# Patient Record
Sex: FEMALE | Race: WHITE | NOT HISPANIC OR LATINO | Employment: OTHER | URBAN - METROPOLITAN AREA
[De-identification: names, ages, dates, MRNs, and addresses within clinical notes are randomized per-mention and may not be internally consistent; named-entity substitution may affect disease eponyms.]

---

## 2017-05-15 ENCOUNTER — TELEPHONE (OUTPATIENT)
Dept: FAMILY MEDICINE | Facility: CLINIC | Age: 72
End: 2017-05-15

## 2017-05-15 NOTE — TELEPHONE ENCOUNTER
Reason for call:  Patient reporting a symptom    Symptom or request: Sofia says she has been getting some chest pain that wakes her up. It is on the left side. She thinks it might be digestive related. She doesn't think it's heart related.  She had this last night around 9:30 and this is the second time in a couple weeks. She was going to schedule to see Dr Galvan but he is out of office the rest of this week. She thinks she might try a bland diet. I told her I would have the RN call her.     Phone Number patient can be reached at:  Home number on file [phone] (home)    Best Time:  anytime    Can we leave a detailed message on this number:  YES    Call taken on 5/15/2017 at 10:25 AM by Avril Knowles

## 2017-05-15 NOTE — TELEPHONE ENCOUNTER
Sofia Freeman is a 71 year old female who calls with chest pain.     PRESENTING PROBLEM:  Chest pain    NURSING ASSESSMENT:  Patient complains of chest pain.  Onset:  Two weeks ago  Pain is characterized as burning and sharp   Severity 7 out of 10  Located left chest   Radiates to none.  Duration intermittent.  Associated symptoms belching and indigestion.  Exacerbated by lying down and eating french fries, any fried food.  Relieved by none.  Says this has happened before and she changed her diet and chest pain diminished  Cardiac risk factors: hypertension and high cholesterol.  Associated Medical History: see problem list  Allergies:   Allergies   Allergen Reactions     Nkda [No Known Drug Allergies]        MEDICATIONS:  Taking medication(s) as prescribed? Yes  Taking over the counter medication(s) ?N/A  Any medication side effects? No significant side effects    Any barriers to taking medication(s) as prescribed?  N/A  Medication(s) improving/managing symptoms?  N/A  Medication reconciliation completed: N/A    Last exam/Treatment:  10-6-16    NURSING PLAN: Nursing advice to patient advised needs to follow up with this.  see Dr Galvan, go to the ED if worse of does not go away    RECOMMENDED DISPOSITION:  Home care advice   Will comply with recommendation: Yes  If further questions/concerns or if symptoms do not improve, worsen or new symptoms develop, call your PCP or [facility] as soon as possible.        Evelin Vanegas RN

## 2017-05-22 ENCOUNTER — OFFICE VISIT (OUTPATIENT)
Dept: FAMILY MEDICINE | Facility: CLINIC | Age: 72
End: 2017-05-22
Payer: COMMERCIAL

## 2017-05-22 VITALS
DIASTOLIC BLOOD PRESSURE: 70 MMHG | HEART RATE: 86 BPM | HEIGHT: 66 IN | TEMPERATURE: 98.4 F | WEIGHT: 175 LBS | SYSTOLIC BLOOD PRESSURE: 110 MMHG | BODY MASS INDEX: 28.12 KG/M2

## 2017-05-22 DIAGNOSIS — R07.9 CHEST PAIN, UNSPECIFIED TYPE: Primary | ICD-10-CM

## 2017-05-22 DIAGNOSIS — Z12.11 COLON CANCER SCREENING: ICD-10-CM

## 2017-05-22 PROCEDURE — 93000 ELECTROCARDIOGRAM COMPLETE: CPT | Performed by: FAMILY MEDICINE

## 2017-05-22 PROCEDURE — 99213 OFFICE O/P EST LOW 20 MIN: CPT | Performed by: FAMILY MEDICINE

## 2017-05-22 NOTE — MR AVS SNAPSHOT
After Visit Summary   5/22/2017    Sofia Freeman    MRN: [id number]           Patient Information     Date Of Birth          1945        Visit Information        Provider Department      5/22/2017 1:40 PM Danie Galvan MD [facility]        Today's Diagnoses     Chest pain, unspecified type    -  1    Colon cancer screening          Care Instructions    ASSESSMENT:   (R07.9) Chest pain, unspecified type  (primary encounter diagnosis)  Comment: This does not appear to be related to heart.  It could be from stomach or chest wall and muscles.   Plan: EKG 12-lead complete w/read - Clinics        If the pain comes back, you could try and antacid like Tums or Maalox or Mylanta to see if they help.    Sometimes we use an acid blocker like omeprazole at 20mg daily for a couple weeks if antacids do not help.   Let me know if pain is worse, occurring during the day particularly with exercise or exertion.   Let me know if additional symptoms.     (Z12.11) Colon cancer screening  Comment:   Plan: Fecal colorectal cancer screen (FIT)        Complete FIT colon cancer screening test and send in the mail.          Follow-ups after your visit        Future tests that were ordered for you today     Open Future Orders        Priority Expected Expires Ordered    Fecal colorectal cancer screen (FIT) Routine 6/12/2017 8/14/2017 5/22/2017            Who to contact     If you have questions or need follow up information about today's clinic visit or your schedule please contact [facility] directly at [phone].  Normal or non-critical lab and imaging results will be communicated to you by MyChart, letter or phone within 4 business days after the clinic has received the results. If you do not hear from us within 7 days, please contact the clinic through MyChart or phone. If you have a critical or abnormal lab result, we will notify you by phone as soon as possible.  Submit  "refill requests through [employer] or call your pharmacy and they will forward the refill request to us. Please allow 3 business days for your refill to be completed.          Additional Information About Your Visit        [employer] Information     [employer] gives you secure access to your electronic health record. If you see a primary care provider, you can also send messages to your care team and make appointments. If you have questions, please call your primary care clinic.  If you do not have a primary care provider, please call [phone] and they will assist you.        Care EveryWhere ID     This is your Care EveryWhere ID. This could be used by other organizations to access your [facility] medical records  [id number]        Your Vitals Were     Pulse Temperature Height BMI (Body Mass Index)          86 98.4  F (36.9  C) (Tympanic) 5' 5.5\" (1.664 m) 28.68 kg/m2         Blood Pressure from Last 3 Encounters:   05/22/17 110/70   10/06/16 130/84   07/07/16 118/78    Weight from Last 3 Encounters:   05/22/17 175 lb (79.4 kg)   10/06/16 173 lb (78.5 kg)   07/26/16 170 lb (77.1 kg)              We Performed the Following     EKG 12-lead complete w/read - Clinics        Primary Care Provider Office Phone # Fax #    Danie Galvan [phone]       [facility] [id number] [id number]        Thank you!     Thank you for choosing [facility]  for your care. Our goal is always to provide you with excellent care. Hearing back from our patients is one way we can continue to improve our services. Please take a few minutes to complete the written survey that you may receive in the mail after your visit with us. Thank you!             Your Updated Medication List - Protect others around you: Learn how to safely use, store and throw away your medicines at www.disposemymeds.org.          This list is accurate as of: 5/22/17  3:03 PM.  Always use your most recent med " list.                   Brand Name Dispense Instructions for use    amLODIPine 2.5 MG tablet    NORVASC    90 tablet    Take 1 tablet (2.5 mg) by mouth daily       aspirin 81 MG tablet     100 tablet    Take 1 tablet (81 mg) by mouth daily       atorvastatin 20 MG tablet    LIPITOR    45 tablet    Take 0.5 tablets (10 mg) by mouth daily       hydrochlorothiazide 25 MG tablet    HYDRODIURIL    90 tablet    Take 1 tablet (25 mg) by mouth daily       THERAPEUTIC MULTIVIT/MINERAL Tabs      1 TABLET BY MOUTH DAILY       valACYclovir 500 MG tablet    VALTREX    6 tablet    Take 1 tablet (500 mg) by mouth 2 times daily

## 2017-05-22 NOTE — PROGRESS NOTES
"  SUBJECTIVE:                                                    Sofia Freeman is a 71 year old female who presents to clinic today for the following health issues:  Chief Complaint   Patient presents with     Chest Pain      Chest Pain      Onset: Last 3 weeks     Description (location/character/radiation/duration): Patient states that 3 times over the last 3 weeks she has woken up with left sided chest pain     Intensity:  moderate, severe    Accompanying signs and symptoms:        Shortness of breath: no        Sweating: no        Nausea/vomitting: YES- nausea       Palpitations: no        Other (fevers/chills/cough/heartburn/lightheadedness): YES-Patient says that she has a lot of burping when this happens     History (similar episodes/previous evaluation): None    Precipitating or alleviating factors:       Worse with exertion: no        Worse with breathing: no        Related to eating: no        Better with burping: YES    Therapies tried and outcome: None     \"Hot\" or stabbing feeling.  Location: left anterolateral chest under left breast.   Triggering factors: has occurred only at night.   Duration: up to 3-4 hours.   It has not occurred during the day.  It has not occurred with activity.   Associated symptoms: burping, belching.   Alleviating factors: has not tried meds.     Feels she is under a lot of stress.   Tired.     BP at home has been up to 154/105.  Usually under 140/90.     Patient Active Problem List   Diagnosis     Genital herpes     Hypertension goal BP (blood pressure) < 140/90     HYPERLIPIDEMIA LDL GOAL <130      ROS:General: No change in weight, sleep or appetite.  Normal energy.  No fever or chills  Resp: No coughing, wheezing or shortness of breath  CV: Negative for palpitations  GI: No nausea, vomiting,  heartburn, abdominal pain, diarrhea, constipation or change in bowel habits  : No urinary frequency or dysuria, bladder or kidney problems  Musculoskeletal: No significant muscle " "or joint pains.  Some wrist arthritis.   Psychiatric: POSITIVE for:, depression, worse in the past 3 weeks.  Horse has been ill.     OBJECTIVE:Blood pressure 110/70, pulse 86, temperature 98.4  F (36.9  C), temperature source Tympanic, height 5' 5.5\" (1.664 m), weight 175 lb (79.4 kg), not currently breastfeeding. BMI=Body mass index is 28.68 kg/(m^2).  GENERAL APPEARANCE ADULT: Alert, no acute distress  NECK: No adenopathy,masses or thyromegaly  RESP: lungs clear to auscultation   CV: normal rate, regular rhythm, no murmur or gallop  ABDOMEN: soft, no organomegaly, masses or tenderness  MS: extremities normal, no peripheral edema  EKG:.NSR and normal.      ASSESSMENT:   (R07.9) Chest pain, unspecified type  (primary encounter diagnosis)  Comment: This does not appear to be related to heart.  It could be from stomach or chest wall and muscles.   Plan: EKG 12-lead complete w/read - Clinics        If the pain comes back, you could try and antacid like Tums or Maalox or Mylanta to see if they help.    Sometimes we use an acid blocker like omeprazole at 20mg daily for a couple weeks if antacids do not help.   Let me know if pain is worse, occurring during the day particularly with exercise or exertion.   Let me know if additional symptoms.     (Z12.11) Colon cancer screening  Comment:   Plan: Fecal colorectal cancer screen (FIT)        Complete FIT colon cancer screening test and send in the mail.       "

## 2017-05-22 NOTE — NURSING NOTE
"Chief Complaint   Patient presents with     Chest Pain       Initial /70 (BP Location: Right arm, Patient Position: Chair, Cuff Size: Adult Large)  Pulse 86  Temp 98.4  F (36.9  C) (Tympanic)  Ht 5' 5.5\" (1.664 m)  Wt 175 lb (79.4 kg)  BMI 28.68 kg/m2 Estimated body mass index is 28.68 kg/(m^2) as calculated from the following:    Height as of this encounter: 5' 5.5\" (1.664 m).    Weight as of this encounter: 175 lb (79.4 kg).  Medication Reconciliation: complete    Health Maintenance that is potentially due pending provider review:  Colonoscopy/FIT    Patient states that she has a fit test at home.    .denisa  "

## 2017-05-22 NOTE — PATIENT INSTRUCTIONS
ASSESSMENT:   (R07.9) Chest pain, unspecified type  (primary encounter diagnosis)  Comment: This does not appear to be related to heart.  It could be from stomach or chest wall and muscles.   Plan: EKG 12-lead complete w/read - Clinics        If the pain comes back, you could try and antacid like Tums or Maalox or Mylanta to see if they help.    Sometimes we use an acid blocker like omeprazole at 20mg daily for a couple weeks if antacids do not help.   Let me know if pain is worse, occurring during the day particularly with exercise or exertion.   Let me know if additional symptoms.     (Z12.11) Colon cancer screening  Comment:   Plan: Fecal colorectal cancer screen (FIT)        Complete FIT colon cancer screening test and send in the mail.

## 2017-05-23 ASSESSMENT — PATIENT HEALTH QUESTIONNAIRE - PHQ9: SUM OF ALL RESPONSES TO PHQ QUESTIONS 1-9: 5

## 2017-06-12 PROCEDURE — 82274 ASSAY TEST FOR BLOOD FECAL: CPT | Performed by: FAMILY MEDICINE

## 2017-06-13 DIAGNOSIS — Z12.11 COLON CANCER SCREENING: ICD-10-CM

## 2017-06-13 LAB — HEMOCCULT STL QL IA: NEGATIVE

## 2017-07-02 ENCOUNTER — APPOINTMENT (OUTPATIENT)
Dept: CT IMAGING | Facility: CLINIC | Age: 72
End: 2017-07-02
Attending: FAMILY MEDICINE
Payer: MEDICARE

## 2017-07-02 ENCOUNTER — HOSPITAL ENCOUNTER (EMERGENCY)
Facility: CLINIC | Age: 72
Discharge: HOME | End: 2017-07-02
Attending: FAMILY MEDICINE | Admitting: FAMILY MEDICINE
Payer: MEDICARE

## 2017-07-02 VITALS
DIASTOLIC BLOOD PRESSURE: 85 MMHG | TEMPERATURE: 98.2 F | WEIGHT: 170 LBS | RESPIRATION RATE: 16 BRPM | BODY MASS INDEX: 27.86 KG/M2 | OXYGEN SATURATION: 96 % | SYSTOLIC BLOOD PRESSURE: 131 MMHG

## 2017-07-02 DIAGNOSIS — E87.6 HYPOKALEMIA: ICD-10-CM

## 2017-07-02 DIAGNOSIS — M79.10 MYALGIA: ICD-10-CM

## 2017-07-02 DIAGNOSIS — M54.50 ACUTE BILATERAL LOW BACK PAIN WITHOUT SCIATICA: ICD-10-CM

## 2017-07-02 DIAGNOSIS — R41.0 CONFUSION: ICD-10-CM

## 2017-07-02 LAB
ALBUMIN SERPL-MCNC: 3.7 G/DL (ref 3.4–5)
ALBUMIN UR-MCNC: NEGATIVE MG/DL
ALP SERPL-CCNC: 72 U/L (ref 40–150)
ALT SERPL W P-5'-P-CCNC: 28 U/L (ref 0–50)
AMORPH CRY #/AREA URNS HPF: ABNORMAL /HPF
ANION GAP SERPL CALCULATED.3IONS-SCNC: 9 MMOL/L (ref 3–14)
APPEARANCE UR: CLEAR
AST SERPL W P-5'-P-CCNC: 19 U/L (ref 0–45)
BASOPHILS # BLD AUTO: 0.1 10E9/L (ref 0–0.2)
BASOPHILS NFR BLD AUTO: 0.4 %
BILIRUB SERPL-MCNC: 1 MG/DL (ref 0.2–1.3)
BILIRUB UR QL STRIP: NEGATIVE
BUN SERPL-MCNC: 20 MG/DL (ref 7–30)
CALCIUM SERPL-MCNC: 9.3 MG/DL (ref 8.5–10.1)
CHLORIDE SERPL-SCNC: 99 MMOL/L (ref 94–109)
CK SERPL-CCNC: 98 U/L (ref 30–225)
CO2 SERPL-SCNC: 26 MMOL/L (ref 20–32)
COLOR UR AUTO: ABNORMAL
CREAT SERPL-MCNC: 0.83 MG/DL (ref 0.52–1.04)
CRP SERPL-MCNC: <2.9 MG/L (ref 0–8)
DIFFERENTIAL METHOD BLD: ABNORMAL
EOSINOPHIL # BLD AUTO: 0.1 10E9/L (ref 0–0.7)
EOSINOPHIL NFR BLD AUTO: 0.6 %
ERYTHROCYTE [DISTWIDTH] IN BLOOD BY AUTOMATED COUNT: 12.8 % (ref 10–15)
GFR SERPL CREATININE-BSD FRML MDRD: 67 ML/MIN/1.7M2
GLUCOSE SERPL-MCNC: 149 MG/DL (ref 70–99)
GLUCOSE UR STRIP-MCNC: NEGATIVE MG/DL
HCT VFR BLD AUTO: 40.3 % (ref 35–47)
HGB BLD-MCNC: 13.8 G/DL (ref 11.7–15.7)
HGB UR QL STRIP: NEGATIVE
IMM GRANULOCYTES # BLD: 0 10E9/L (ref 0–0.4)
IMM GRANULOCYTES NFR BLD: 0.2 %
KETONES UR STRIP-MCNC: NEGATIVE MG/DL
LEUKOCYTE ESTERASE UR QL STRIP: ABNORMAL
LYMPHOCYTES # BLD AUTO: 2.3 10E9/L (ref 0.8–5.3)
LYMPHOCYTES NFR BLD AUTO: 17.4 %
MAGNESIUM SERPL-MCNC: 2.1 MG/DL (ref 1.6–2.3)
MCH RBC QN AUTO: 30.3 PG (ref 26.5–33)
MCHC RBC AUTO-ENTMCNC: 34.2 G/DL (ref 31.5–36.5)
MCV RBC AUTO: 89 FL (ref 78–100)
MONOCYTES # BLD AUTO: 1 10E9/L (ref 0–1.3)
MONOCYTES NFR BLD AUTO: 7.6 %
MYOGLOBIN SERPL-MCNC: 53 UG/L
NEUTROPHILS # BLD AUTO: 9.6 10E9/L (ref 1.6–8.3)
NEUTROPHILS NFR BLD AUTO: 73.8 %
NITRATE UR QL: NEGATIVE
PH UR STRIP: 7 PH (ref 5–7)
PHOSPHATE SERPL-MCNC: 2.1 MG/DL (ref 2.5–4.5)
PLATELET # BLD AUTO: 295 10E9/L (ref 150–450)
POTASSIUM SERPL-SCNC: 3.1 MMOL/L (ref 3.4–5.3)
PROT SERPL-MCNC: 7.4 G/DL (ref 6.8–8.8)
RBC # BLD AUTO: 4.55 10E12/L (ref 3.8–5.2)
RBC #/AREA URNS AUTO: <1 /HPF (ref 0–2)
SODIUM SERPL-SCNC: 134 MMOL/L (ref 133–144)
SP GR UR STRIP: 1 (ref 1–1.03)
SQUAMOUS #/AREA URNS AUTO: <1 /HPF (ref 0–1)
URN SPEC COLLECT METH UR: ABNORMAL
UROBILINOGEN UR STRIP-MCNC: NORMAL MG/DL (ref 0–2)
WBC # BLD AUTO: 12.9 10E9/L (ref 4–11)
WBC #/AREA URNS AUTO: 4 /HPF (ref 0–2)

## 2017-07-02 PROCEDURE — 82550 ASSAY OF CK (CPK): CPT | Performed by: FAMILY MEDICINE

## 2017-07-02 PROCEDURE — 83874 ASSAY OF MYOGLOBIN: CPT | Performed by: FAMILY MEDICINE

## 2017-07-02 PROCEDURE — 25000125 ZZHC RX 250: Performed by: FAMILY MEDICINE

## 2017-07-02 PROCEDURE — 74176 CT ABD & PELVIS W/O CONTRAST: CPT

## 2017-07-02 PROCEDURE — 83735 ASSAY OF MAGNESIUM: CPT | Performed by: FAMILY MEDICINE

## 2017-07-02 PROCEDURE — 86140 C-REACTIVE PROTEIN: CPT | Performed by: FAMILY MEDICINE

## 2017-07-02 PROCEDURE — 85025 COMPLETE CBC W/AUTO DIFF WBC: CPT | Performed by: FAMILY MEDICINE

## 2017-07-02 PROCEDURE — 96365 THER/PROPH/DIAG IV INF INIT: CPT

## 2017-07-02 PROCEDURE — A9270 NON-COVERED ITEM OR SERVICE: HCPCS | Mod: GY | Performed by: FAMILY MEDICINE

## 2017-07-02 PROCEDURE — 70450 CT HEAD/BRAIN W/O DYE: CPT

## 2017-07-02 PROCEDURE — 80053 COMPREHEN METABOLIC PANEL: CPT | Performed by: FAMILY MEDICINE

## 2017-07-02 PROCEDURE — 25000132 ZZH RX MED GY IP 250 OP 250 PS 637: Mod: GY | Performed by: FAMILY MEDICINE

## 2017-07-02 PROCEDURE — 87086 URINE CULTURE/COLONY COUNT: CPT | Performed by: FAMILY MEDICINE

## 2017-07-02 PROCEDURE — 96361 HYDRATE IV INFUSION ADD-ON: CPT

## 2017-07-02 PROCEDURE — 96375 TX/PRO/DX INJ NEW DRUG ADDON: CPT

## 2017-07-02 PROCEDURE — 99284 EMERGENCY DEPT VISIT MOD MDM: CPT | Performed by: FAMILY MEDICINE

## 2017-07-02 PROCEDURE — 84100 ASSAY OF PHOSPHORUS: CPT | Performed by: FAMILY MEDICINE

## 2017-07-02 PROCEDURE — 25000128 H RX IP 250 OP 636: Performed by: FAMILY MEDICINE

## 2017-07-02 PROCEDURE — 99284 EMERGENCY DEPT VISIT MOD MDM: CPT | Mod: 25

## 2017-07-02 PROCEDURE — 81001 URINALYSIS AUTO W/SCOPE: CPT | Performed by: FAMILY MEDICINE

## 2017-07-02 RX ORDER — SODIUM CHLORIDE 9 MG/ML
1000 INJECTION, SOLUTION INTRAVENOUS CONTINUOUS
Status: DISCONTINUED | OUTPATIENT
Start: 2017-07-02 | End: 2017-07-02 | Stop reason: HOSPADM

## 2017-07-02 RX ORDER — KETOROLAC TROMETHAMINE 15 MG/ML
15 INJECTION, SOLUTION INTRAMUSCULAR; INTRAVENOUS ONCE
Status: COMPLETED | OUTPATIENT
Start: 2017-07-02 | End: 2017-07-02

## 2017-07-02 RX ORDER — POTASSIUM CL/LIDO/0.9 % NACL 10MEQ/0.1L
10 INTRAVENOUS SOLUTION, PIGGYBACK (ML) INTRAVENOUS
Status: DISCONTINUED | OUTPATIENT
Start: 2017-07-02 | End: 2017-07-02 | Stop reason: HOSPADM

## 2017-07-02 RX ORDER — OXYCODONE AND ACETAMINOPHEN 5; 325 MG/1; MG/1
1-2 TABLET ORAL EVERY 6 HOURS PRN
Qty: 10 TABLET | Refills: 0 | Status: SHIPPED | OUTPATIENT
Start: 2017-07-02 | End: 2019-07-22

## 2017-07-02 RX ORDER — ACETAMINOPHEN 500 MG
1000 TABLET ORAL ONCE
Status: COMPLETED | OUTPATIENT
Start: 2017-07-02 | End: 2017-07-02

## 2017-07-02 RX ORDER — DICLOFENAC SODIUM 75 MG/1
75 TABLET, DELAYED RELEASE ORAL 2 TIMES DAILY PRN
Qty: 30 TABLET | Refills: 0 | Status: SHIPPED | OUTPATIENT
Start: 2017-07-02 | End: 2019-07-22

## 2017-07-02 RX ADMIN — KETOROLAC TROMETHAMINE 15 MG: 15 INJECTION, SOLUTION INTRAMUSCULAR; INTRAVENOUS at 16:01

## 2017-07-02 RX ADMIN — ACETAMINOPHEN 1000 MG: 500 TABLET ORAL at 18:31

## 2017-07-02 RX ADMIN — SODIUM CHLORIDE 1000 ML: 9 INJECTION, SOLUTION INTRAVENOUS at 15:45

## 2017-07-02 RX ADMIN — POTASSIUM CHLORIDE 10 MEQ: 14.9 INJECTION, SOLUTION, CONCENTRATE PARENTERAL at 17:48

## 2017-07-02 RX ADMIN — SODIUM CHLORIDE 1000 ML: 9 INJECTION, SOLUTION INTRAVENOUS at 16:35

## 2017-07-02 NOTE — ED NOTES
Talked with MD about sepsis flag, he is holding off on the lactic acid at this time, will reassess later. Monitor

## 2017-07-02 NOTE — ED PROVIDER NOTES
History     Chief Complaint   Patient presents with     Muscle Pain     abd cramping that moves into LE.  pt woke up this AM with pain and had difficulty walking.  Tylenol helped.       HPI  Sofia Freeman is a 71 year old female, past medical history significant for hypertension, hyperlipidemia, migraine headaches, presents the emergency department with concerns of bilateral low back pain and an anterior abdominal pain with radiation down her legs beginning this morning upon awakening.  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  He felt fine going to bed last night.  When she woke this morning she had a dull aching sensation in her bilateral low back down both legs both anteriorly and posteriorly and in her abdomen as well.  This is progressed over the course of the day to more of a cramping sensation which is primarily centered around her legs.  There is no associated nausea or vomiting.  No fever chills or sweats.  She notes no change in bowel habits and denies constipation or diarrhea.  No urinary tract symptoms such as frequency urgency or dysuria.  He has taken no medication for this.  She is worried about the possibility of her statin medication affecting muscles specifically.     Active Ambulatory Problems     Diagnosis Date Noted     Genital herpes      Hypertension goal BP (blood pressure) < 140/90 06/19/2006     HYPERLIPIDEMIA LDL GOAL <130 10/31/2010     Resolved Ambulatory Problems     Diagnosis Date Noted     History of peptic ulcer disease      Senile nuclear sclerosis 08/17/2011     Past Medical History:   Diagnosis Date     Cataract 8/17/2011     MIGRAINE NOS W/O MENTN INTRACTABLE      Past Surgical History:   Procedure Laterality  Date     APPENDECTOMY OPEN CHILD  1965    Appendectomy     COLONOSCOPY  1/6/2005    colonoscopy-normal repeat in 10 years     ESOPHAGOSCOPY, GASTROSCOPY, DUODENOSCOPY (EGD), COMBINED  9/21/2012    Procedure: COMBINED ESOPHAGOSCOPY, GASTROSCOPY, DUODENOSCOPY (EGD);  Gastroscopy;  Surgeon: Dorothy Hill MD;  Location: [facility]     PHACOEMULSIFICATION WITH STANDARD INTRAOCULAR LENS IMPLANT  8/18/2011    Procedure:PHACOEMULSIFICATION WITH STANDARD INTRAOCULAR LENS IMPLANT; Surgeon:JAY URBINA; Location:[facility]     PHACOEMULSIFICATION WITH STANDARD INTRAOCULAR LENS IMPLANT  9/1/2011    Procedure:PHACOEMULSIFICATION WITH STANDARD INTRAOCULAR LENS IMPLANT; Left Phacoemulsification with standard intraocular lens implant; Surgeon:JAY URBINA; Location:[facility]     Social History     Social History     Marital status:      Spouse name: N/A     Number of children: N/A     Years of education: N/A     Occupational History     Not on file.     Social History Main Topics     Smoking status: Never Smoker     Smokeless tobacco: Never Used     Alcohol use No     Drug use: No     Sexual activity: Not Currently     Partners: Male     Birth control/ protection: Post-menopausal     Other Topics Concern     Self-Exams No     Parent/Sibling W/ Cabg, Mi Or Angioplasty Before 65f 55m? No     Social History Narrative     Family History   Problem Relation Age of Onset     Hypertension Mother      Arthritis Mother      Eye Disorder Mother      glaucoma     HEART DISEASE Father      Alzheimer Disease Father      Hypertension Father      CEREBROVASCULAR DISEASE Maternal Grandmother      Alzheimer Disease Maternal Grandmother      DIABETES Maternal Grandmother      Alcohol/Drug Maternal Grandfather      Eye Disorder Sister      glaucoma     Arthritis Sister      Eye Disorder Sister      glaucoma     Hypertension Sister      CANCER Sister      Pancreatic      No current facility-administered medications for this  encounter.      Current Outpatient Prescriptions   Medication     diclofenac (VOLTAREN) 75 MG EC tablet     oxyCODONE-acetaminophen (PERCOCET) 5-325 MG per tablet     atorvastatin (LIPITOR) 20 MG tablet     hydrochlorothiazide (HYDRODIURIL) 25 MG tablet     amLODIPine (NORVASC) 2.5 MG tablet     valACYclovir (VALTREX) 500 MG tablet     aspirin 81 MG tablet     THERAPEUTIC MULTIVIT/MINERAL OR TABS        Allergies   Allergen Reactions     Nkda [No Known Drug Allergies]        I have reviewed the Medications, Allergies, Past Medical and Surgical History, and Social History in the Epic system.         Review of Systems   All other systems reviewed and are negative.      Physical Exam   BP: (!) 156/93  Heart Rate: 114  Temp: 98.2  F (36.8  C)  Resp: 18  Weight: 77.1 kg (170 lb)  SpO2: 97 %  Physical Exam   Constitutional: She is oriented to person, place, and time. She appears well-developed and well-nourished.   HENT:   Head: Normocephalic and atraumatic.   Right Ear: External ear normal.   Left Ear: External ear normal.   Nose: Nose normal.   Mouth/Throat: Oropharynx is clear and moist.   Eyes: Conjunctivae and EOM are normal. Pupils are equal, round, and reactive to light.   Neck: Normal range of motion. Neck supple.   Cardiovascular: Normal rate, regular rhythm, normal heart sounds and intact distal pulses.    Pulmonary/Chest: Effort normal and breath sounds normal.   Abdominal: Soft. Bowel sounds are normal.   Musculoskeletal:        Back:    Neurological: She is alert and oriented to person, place, and time.   Skin: Skin is warm and dry.   Psychiatric: She has a normal mood and affect. Her behavior is normal.   Nursing note and vitals reviewed.      ED Course     ED Course     Procedures             Critical Care time:  none               Labs Ordered and Resulted from Time of ED Arrival Up to the Time of Departure from the ED   CBC WITH PLATELETS DIFFERENTIAL - Abnormal; Notable for the following:        Result  Value    WBC 12.9 (*)     Absolute Neutrophil 9.6 (*)     All other components within normal limits   COMPREHENSIVE METABOLIC PANEL - Abnormal; Notable for the following:     Potassium 3.1 (*)     Glucose 149 (*)     All other components within normal limits   PHOSPHORUS - Abnormal; Notable for the following:     Phosphorus 2.1 (*)     All other components within normal limits   UA MACROSCOPIC WITH REFLEX TO MICRO AND CULTURE - Abnormal; Notable for the following:     Leukocyte Esterase Urine Small (*)     WBC Urine 4 (*)     Amorphous Crystals Moderate (*)     All other components within normal limits   CRP INFLAMMATION   MAGNESIUM   MYOGLOBIN   CK TOTAL   URINE CULTURE AEROBIC BACTERIAL     Results for orders placed or performed during the hospital encounter of 07/02/17   Abd/pelvis CT - no contrast - Stone Protocol    Narrative    CT ABDOMEN AND PELVIS WITHOUT CONTRAST 7/2/2017 6:25 PM     HISTORY: Bilateral flank pain.    COMPARISON: None.    TECHNIQUE: Axial CT images of the abdomen and pelvis from the  diaphragm to the symphysis pubis were acquired without IV contrast.  Radiation dose for this scan was reduced using automated exposure  control, adjustment of the mA and/or kV according to patient size, or  iterative reconstruction technique.    FINDINGS: There are no stones seen within either kidney, either  ureter, or the bladder. There is no hydroureter or hydronephrosis.  There is no perinephric fat stranding. Kidneys are normal in size and  configuration.  Appendix is not seen. There are no dilated loops of  small intestine or large bowel to suggest ileus or obstruction. No  free air or free fluid. There is mild diverticulosis without evidence  for diverticulitis. There are mild atherosclerotic changes of the  visualized aorta and its branches. There is no evidence of aortic  aneurysm. Liver and gallbladder unremarkable. No splenomegaly.  No  definite adrenal nodules.  Pancreas grossly unremarkable.  The  remainder of the visualized abdomen is unremarkable on this  noncontrast scan. Survey of the visualized bony structures  demonstrates no destructive bony lesions. Marked degenerative changes  of the spine.  The visualized lung bases are unremarkable.       Impression    IMPRESSION: No acute process demonstrated in the abdomen and pelvis.  Marked degenerative changes of the spine.       6:55 PM  Requested to see the patient by the patient's nurse and she seems confused as to how she came to be here and why she is here.  She's received Toradol and acetaminophen as well as potassium.  No narcotic pain medications, no antinausea medications that she has no nausea.  When rechecked on the patient she does seem legitimately puzzled as to why she came in and why she is here.  She could not remember how she got to the emergency department.  She is a little bit distressed with this but willing to cooperate with further workup to try to determine the reason for her confusion.    8:16 PM  The patient is alert and oriented ×3.  Appropriately conversant.  GCS 15.  She now feels that the confusion that she experienced earlier was likely because she had just woken up from napping in the emergency department and was briefly confused at that time.  Head CT obtained was negative.  Ambulatory in the department without difficulty.  Much more comfortable.      Assessments & Plan (with Medical Decision Making)   71-year-old female past medical history reviewed above, presents to the emergency department with sensation of abdominal low back and lower extremity cramping and difficulties walking as described in the HPI.  Relatively acute onset without apparent clearcut precipitated.  The patient does heavy lifting or straining pending on a routine basis but could not recall anything out of the ordinary for her recently.  She improved somewhat with initial management but not completely and alternate differential diagnoses were considered  for explanation of her pain and for that reason imaging studies were obtained of the abdomen and pelvis and low back.  No acute process was identified however marked degenerative changes of the spine were noted.  The patient developed a transient episode of confusion which was likely in hindsight related to awakening in a strange place; no concerning etiology was found at her mental status normalized.  The patient had not received narcotic medications nor antinausea medications that could potentially altered mental status.  She had only received NSAID class medication which I would not expect would cause a change in her mental status.  Her potassium was noted to be low and initial replacement performed IV in the emergency department and dietary recommendations made.  Follow-up on the potassium and a recheck in clinic was also recommended in the next 7-10 days.  I suspect that the etiology of the patient's discomfort in the emergency department is in fact muscular skeletal, given her age and the significant robust physical exertion she performs on a daily basis I suspect that this is a combination of aging and heavy physical activity that has led to her presentation.  I discussed this E with her.  If she does not continue to improve with appropriate NSAID and as needed narcotic pain medication over the next several days with decreased activity I recommended either return to the emergency department or follow up in clinic.      Disclaimer: This note consists of symbols derived from keyboarding, dictation and/or voice recognition software. As a result, there may be errors in the script that have gone undetected. Please consider this when interpreting information found in this chart.      I have reviewed the nursing notes.    I have reviewed the findings, diagnosis, plan and need for follow up with the patient.       Discharge Medication List as of 7/2/2017  8:30 PM      START taking these medications    Details    diclofenac (VOLTAREN) 75 MG EC tablet Take 1 tablet (75 mg) by mouth 2 times daily as needed for moderate pain, Disp-30 tablet, R-0, Local Print      oxyCODONE-acetaminophen (PERCOCET) 5-325 MG per tablet Take 1-2 tablets by mouth every 6 hours as needed for moderate to severe pain, Disp-10 tablet, R-0, Local Print             Final diagnoses:   Acute bilateral low back pain without sciatica   Myalgia   Hypokalemia   Confusion - Transient episode; resolved       7/2/2017   [facility] EMERGENCY DEPARTMENT     Roby Craven MD  07/06/17 [id number]

## 2017-07-02 NOTE — ED AVS SNAPSHOT
[facility] Emergency Department    [address] [address]    Phone:  [phone]    Fax:  [phone]                                       Sofia Freeman   MRN: [id number]    Department:  [facility] Emergency Department   Date of Visit:  7/2/2017           After Visit Summary Signature Page     I have received my discharge instructions, and my questions have been answered. I have discussed any challenges I see with this plan with the nurse or doctor.    ..........................................................................................................................................  Patient/Patient Representative Signature      ..........................................................................................................................................  Patient Representative Print Name and Relationship to Patient    ..................................................               ................................................  Date                                            Time    ..........................................................................................................................................  Reviewed by Signature/Title    ...................................................              ..............................................  Date                                                            Time

## 2017-07-02 NOTE — ED NOTES
Pt presents to ED with complaints of a cramping pain that starts in the middle of her back and wraps around her waist and also runs down her right leg. Pt has never had pain like this before. Pt reports the pain woke her up at about 5 am. Pt does not recall doing anything yesterday that could have provoked this pain, pt denies recent trauma. Pt notes she has had the chills today, no other signs or symptoms of infection. Pt reports eating and drinking ok up until today. No change in bowel or bladder. Last BM this am, normal.

## 2017-07-02 NOTE — ED AVS SNAPSHOT
[facility] Emergency Department    [address] [facility] [address]    Phone:  [phone]    Fax:  [phone]                                       Sofia Freeman   MRN: [id number]    Department:  [facility] Emergency Department   Date of Visit:  7/2/2017           Patient Information     Date Of Birth          1945        Your diagnoses for this visit were:     Acute bilateral low back pain without sciatica     Myalgia     Hypokalemia     Confusion Transient episode; resolved       You were seen by Roby Craven MD.      Follow-up Information     Follow up with Danie Galvan MD. Schedule an appointment as soon as possible for a visit in 1 week.    Specialty:  Family Practice    Why:  Potassium rechecked    Contact information:    [place] [id number] [address] [place] [address]  [phone]          Discharge Instructions       Try to decrease your level of activity especially with respects to lifting and straining over the next 2-3 days to allow your body's recovery time.  Potassium rich foods as we discussed with the plan on recheck of her potassium in approximately 7-10 days with her primary care provider.  Diclofenac 75 mg 2 times daily with food as needed over the next 7-10 days.  Push fluids, rest.  Return to the emergency department if worse or changes.      24 Hour Appointment Hotline       To make an appointment at any [facility] clinic, call [phone] ([phone]). If you don't have a family doctor or clinic, we will help you find one. [facility] clinics are conveniently located to serve the needs of you and your family.             Review of your medicines      START taking        Dose / Directions Last dose taken    diclofenac 75 MG EC tablet   Commonly known as:  VOLTAREN   Dose:  75 mg   Quantity:  30 tablet        Take 1 tablet (75 mg) by mouth 2 times daily as needed for moderate pain   Refills:  0        oxyCODONE-acetaminophen 5-325  MG per tablet   Commonly known as:  PERCOCET   Dose:  1-2 tablet   Quantity:  10 tablet        Take 1-2 tablets by mouth every 6 hours as needed for moderate to severe pain   Refills:  0          Our records show that you are taking the medicines listed below. If these are incorrect, please call your family doctor or clinic.        Dose / Directions Last dose taken    amLODIPine 2.5 MG tablet   Commonly known as:  NORVASC   Dose:  2.5 mg   Quantity:  90 tablet        Take 1 tablet (2.5 mg) by mouth daily   Refills:  3        aspirin 81 MG tablet   Dose:  81 mg   Quantity:  100 tablet        Take 1 tablet (81 mg) by mouth daily   Refills:  6        atorvastatin 20 MG tablet   Commonly known as:  LIPITOR   Dose:  10 mg   Quantity:  45 tablet        Take 0.5 tablets (10 mg) by mouth daily   Refills:  3        hydrochlorothiazide 25 MG tablet   Commonly known as:  HYDRODIURIL   Dose:  25 mg   Quantity:  90 tablet        Take 1 tablet (25 mg) by mouth daily   Refills:  3        THERAPEUTIC MULTIVIT/MINERAL Tabs        1 TABLET BY MOUTH DAILY   Refills:  0        valACYclovir 500 MG tablet   Commonly known as:  VALTREX   Dose:  500 mg   Quantity:  6 tablet        Take 1 tablet (500 mg) by mouth 2 times daily   Refills:  3                Prescriptions were sent or printed at these locations (2 Prescriptions)                   Other Prescriptions                Printed at Department/Unit printer (2 of 2)         diclofenac (VOLTAREN) 75 MG EC tablet               oxyCODONE-acetaminophen (PERCOCET) 5-325 MG per tablet                Procedures and tests performed during your visit     Abd/pelvis CT - no contrast - Stone Protocol    CBC with platelets differential    CK total    CRP inflammation    CT Head w/o Contrast    Comprehensive metabolic panel    Magnesium    Myoglobin    Phosphorus    UA reflex to Microscopic and Culture    Urine Culture Aerobic Bacterial      Orders Needing Specimen Collection     None      Pending  Results     Date and Time Order Name Status Description    7/2/2017 1542 Urine Culture Aerobic Bacterial In process             Pending Culture Results     Date and Time Order Name Status Description    7/2/2017 1542 Urine Culture Aerobic Bacterial In process             Pending Results Instructions     If you had any lab results that were not finalized at the time of your Discharge, you can call the ED Lab Result RN at [phone]. You will be contacted by this team for any positive Lab results or changes in treatment. The nurses are available 7 days a week from 10A to 6:30P.  You can leave a message 24 hours per day and they will return your call.        Test Results From Your Hospital Stay        7/2/2017  3:54 PM      Component Results     Component Value Ref Range & Units Status    WBC 12.9 (H) 4.0 - 11.0 10e9/L Final    RBC Count 4.55 3.8 - 5.2 10e12/L Final    Hemoglobin 13.8 11.7 - 15.7 g/dL Final    Hematocrit 40.3 35.0 - 47.0 % Final    MCV 89 78 - 100 fl Final    MCH 30.3 26.5 - 33.0 pg Final    MCHC 34.2 31.5 - 36.5 g/dL Final    RDW 12.8 10.0 - 15.0 % Final    Platelet Count 295 150 - 450 10e9/L Final    Diff Method Automated Method  Final    % Neutrophils 73.8 % Final    % Lymphocytes 17.4 % Final    % Monocytes 7.6 % Final    % Eosinophils 0.6 % Final    % Basophils 0.4 % Final    % Immature Granulocytes 0.2 % Final    Absolute Neutrophil 9.6 (H) 1.6 - 8.3 10e9/L Final    Absolute Lymphocytes 2.3 0.8 - 5.3 10e9/L Final    Absolute Monocytes 1.0 0.0 - 1.3 10e9/L Final    Absolute Eosinophils 0.1 0.0 - 0.7 10e9/L Final    Absolute Basophils 0.1 0.0 - 0.2 10e9/L Final    Abs Immature Granulocytes 0.0 0 - 0.4 10e9/L Final         7/2/2017  4:11 PM      Component Results     Component Value Ref Range & Units Status    CRP Inflammation <2.9 0.0 - 8.0 mg/L Final         7/2/2017  4:11 PM      Component Results     Component Value Ref Range & Units Status    Sodium 134 133 - 144 mmol/L Final    Potassium 3.1  (L) 3.4 - 5.3 mmol/L Final    Chloride 99 94 - 109 mmol/L Final    Carbon Dioxide 26 20 - 32 mmol/L Final    Anion Gap 9 3 - 14 mmol/L Final    Glucose 149 (H) 70 - 99 mg/dL Final    Urea Nitrogen 20 7 - 30 mg/dL Final    Creatinine 0.83 0.52 - 1.04 mg/dL Final    GFR Estimate 67 >60 mL/min/1.7m2 Final    Non  GFR Calc    GFR Estimate If Black 81 >60 mL/min/1.7m2 Final    African American GFR Calc    Calcium 9.3 8.5 - 10.1 mg/dL Final    Bilirubin Total 1.0 0.2 - 1.3 mg/dL Final    Albumin 3.7 3.4 - 5.0 g/dL Final    Protein Total 7.4 6.8 - 8.8 g/dL Final    Alkaline Phosphatase 72 40 - 150 U/L Final    ALT 28 0 - 50 U/L Final    AST 19 0 - 45 U/L Final         7/2/2017  4:08 PM      Component Results     Component Value Ref Range & Units Status    Magnesium 2.1 1.6 - 2.3 mg/dL Final         7/2/2017  4:11 PM      Component Results     Component Value Ref Range & Units Status    Phosphorus 2.1 (L) 2.5 - 4.5 mg/dL Final         7/2/2017  4:11 PM      Component Results     Component Value Ref Range & Units Status    Myoglobin 53 <120 ug/L Final         7/2/2017  5:15 PM      Component Results     Component Value Ref Range & Units Status    Color Urine Straw  Final    Appearance Urine Clear  Final    Glucose Urine Negative NEG mg/dL Final    Bilirubin Urine Negative NEG Final    Ketones Urine Negative NEG mg/dL Final    Specific Gravity Urine 1.004 1.003 - 1.035 Final    Blood Urine Negative NEG Final    pH Urine 7.0 5.0 - 7.0 pH Final    Protein Albumin Urine Negative NEG mg/dL Final    Urobilinogen mg/dL Normal 0.0 - 2.0 mg/dL Final    Nitrite Urine Negative NEG Final    Leukocyte Esterase Urine Small (A) NEG Final    Source Midstream Urine  Final    RBC Urine <1 0 - 2 /HPF Final    WBC Urine 4 (H) 0 - 2 /HPF Final    Squamous Epithelial /HPF Urine <1 0 - 1 /HPF Final    Amorphous Crystals Moderate (A) NEG /HPF Final         7/2/2017  5:07 PM         7/2/2017  4:11 PM      Component Results      Component Value Ref Range & Units Status    CK Total 98 30 - 225 U/L Final         7/2/2017  7:22 PM      Narrative     CT ABDOMEN AND PELVIS WITHOUT CONTRAST 7/2/2017 6:25 PM     HISTORY: Bilateral flank pain.    COMPARISON: None.    TECHNIQUE: Axial CT images of the abdomen and pelvis from the  diaphragm to the symphysis pubis were acquired without IV contrast.  Radiation dose for this scan was reduced using automated exposure  control, adjustment of the mA and/or kV according to patient size, or  iterative reconstruction technique.    FINDINGS: There are no stones seen within either kidney, either  ureter, or the bladder. There is no hydroureter or hydronephrosis.  There is no perinephric fat stranding. Kidneys are normal in size and  configuration.  Appendix is not seen. There are no dilated loops of  small intestine or large bowel to suggest ileus or obstruction. No  free air or free fluid. There is mild diverticulosis without evidence  for diverticulitis. There are mild atherosclerotic changes of the  visualized aorta and its branches. There is no evidence of aortic  aneurysm. Liver and gallbladder unremarkable. No splenomegaly.  No  definite adrenal nodules.  Pancreas grossly unremarkable. The  remainder of the visualized abdomen is unremarkable on this  noncontrast scan. Survey of the visualized bony structures  demonstrates no destructive bony lesions. Marked degenerative changes  of the spine.  The visualized lung bases are unremarkable.         Impression     IMPRESSION: No acute process demonstrated in the abdomen and pelvis.  Marked degenerative changes of the spine.    FAUSTINA DORMAN MD         7/2/2017  8:28 PM      Narrative     CT SCAN OF THE HEAD WITHOUT CONTRAST   7/2/2017 7:07 PM     HISTORY: Confusion.    TECHNIQUE:  Axial images of the head and coronal reformations without  IV contrast material.  Radiation dose for this scan was reduced using  automated exposure control, adjustment of the mA  "and/or kV according  to patient size, or iterative reconstruction technique.    COMPARISON: 2012    FINDINGS: There is some borderline mild supratentorial parenchymal  volume loss with some minimal nonspecific white matter changes without  mass effect. There is no evidence for intracranial hemorrhage, mass  effect, acute infarct, or skull fracture.        Impression     IMPRESSION: Chronic changes. No evidence for intracranial hemorrhage  or any acute process.    ARMEN RAE MD                Thank you for choosing [facility]       Thank you for choosing [facility] for your care. Our goal is always to provide you with excellent care. Hearing back from our patients is one way we can continue to improve our services. Please take a few minutes to complete the written survey that you may receive in the mail after you visit with us. Thank you!        [facility] Information     [employer] lets you send messages to your doctor, view your test results, renew your prescriptions, schedule appointments and more. To sign up, go to [email or web address] . Click on \"Log in\" on the left side of the screen, which will take you to the Welcome page. Then click on \"Sign up Now\" on the right side of the page.     You will be asked to enter the access code listed below, as well as some personal information. Please follow the directions to create your username and password.     Your access code is: [email or web address]  Expires: 2017  7:06 PM     Your access code will  in 90 days. If you need help or a new code, please call your [facility] clinic or [phone].        Care EveryWhere ID     This is your Care EveryWhere ID. This could be used by other organizations to access your [facility] medical records  [id number]        Equal Access to Services     [facility] : Hadii lizett olguin Sovirgen, waaxda luqadaha, qaybta kalayla whipple. So [facility] [phone].    ATENCIÓN: Si adama zamarripa, " tiene a shine disposición servicios gratuitos de asistencia lingüística. Llbety al [phone].    We comply with applicable federal civil rights laws and [place] laws. We do not discriminate on the basis of race, color, national origin, age, disability sex, sexual orientation or gender identity.            After Visit Summary       This is your record. Keep this with you and show to your community pharmacist(s) and doctor(s) at your next visit.

## 2017-07-03 NOTE — ED NOTES
Pt now recalls the pain that brought her in earlier. Pt aware of her brief confusion episode earlier and states she's never had anything like that before, but it has now passed. Pt remains pain free. Pt feels comfortable going home.

## 2017-07-03 NOTE — ED NOTES
Pt escorted to vehicle. Pt ambulates with steady gait. Pt remains alert and oriented. Pt verbally reviews her route home with RN, has no concerns with plan to discharge home. Pt reports she is comfortable driving home before it gets dark.

## 2017-07-03 NOTE — ED NOTES
"Pt returned to room after using the bathroom, was plugging patient back into the monitors when she began repetitively asking staff, \"how did I get here?\" and \"why am I here?\". At this point pt reports being pain free. MD informed of mental status change. Pt is oriented to place (knows she's in ED) and self. Pt disoriented to situation and is surprised to see it is nearly 7pm.   "

## 2017-07-03 NOTE — ED NOTES
All discharge instructions reviewed with patient. All questions answered. Pt called  to inform her she would be on her way soon.

## 2017-07-03 NOTE — DISCHARGE INSTRUCTIONS
Try to decrease your level of activity especially with respects to lifting and straining over the next 2-3 days to allow your body's recovery time.  Potassium rich foods as we discussed with the plan on recheck of her potassium in approximately 7-10 days with her primary care provider.  Diclofenac 75 mg 2 times daily with food as needed over the next 7-10 days.  Push fluids, rest.  Return to the emergency department if worse or changes.

## 2017-07-03 NOTE — ED NOTES
Upon return from abdominal CT pt still complaining of pain, requesting to use the bathroom. Pt ambulates to the bathroom independently.

## 2017-07-03 NOTE — ED NOTES
Pt is a/o x 4, does remember me from earlier this evening. She denies pain/discomfort. Call light in reach. Monitor

## 2017-07-04 LAB
BACTERIA SPEC CULT: NORMAL
MICRO REPORT STATUS: NORMAL
SPECIMEN SOURCE: NORMAL

## 2017-07-07 ENCOUNTER — OFFICE VISIT (OUTPATIENT)
Dept: FAMILY MEDICINE | Facility: CLINIC | Age: 72
End: 2017-07-07
Payer: COMMERCIAL

## 2017-07-07 VITALS
HEART RATE: 82 BPM | SYSTOLIC BLOOD PRESSURE: 120 MMHG | DIASTOLIC BLOOD PRESSURE: 80 MMHG | WEIGHT: 172 LBS | BODY MASS INDEX: 28.19 KG/M2 | TEMPERATURE: 98.8 F | RESPIRATION RATE: 18 BRPM

## 2017-07-07 DIAGNOSIS — M79.10 MYALGIA: ICD-10-CM

## 2017-07-07 DIAGNOSIS — E87.6 HYPOKALEMIA: Primary | ICD-10-CM

## 2017-07-07 DIAGNOSIS — I10 HYPERTENSION GOAL BP (BLOOD PRESSURE) < 140/90: ICD-10-CM

## 2017-07-07 DIAGNOSIS — E78.5 HYPERLIPIDEMIA LDL GOAL <130: ICD-10-CM

## 2017-07-07 LAB
ANION GAP SERPL CALCULATED.3IONS-SCNC: 6 MMOL/L (ref 3–14)
BUN SERPL-MCNC: 23 MG/DL (ref 7–30)
CALCIUM SERPL-MCNC: 9.7 MG/DL (ref 8.5–10.1)
CHLORIDE SERPL-SCNC: 98 MMOL/L (ref 94–109)
CO2 SERPL-SCNC: 31 MMOL/L (ref 20–32)
CREAT SERPL-MCNC: 0.71 MG/DL (ref 0.52–1.04)
GFR SERPL CREATININE-BSD FRML MDRD: 81 ML/MIN/1.7M2
GLUCOSE SERPL-MCNC: 79 MG/DL (ref 70–99)
POTASSIUM SERPL-SCNC: 3.6 MMOL/L (ref 3.4–5.3)
SODIUM SERPL-SCNC: 135 MMOL/L (ref 133–144)

## 2017-07-07 PROCEDURE — 99213 OFFICE O/P EST LOW 20 MIN: CPT | Performed by: FAMILY MEDICINE

## 2017-07-07 PROCEDURE — 80048 BASIC METABOLIC PNL TOTAL CA: CPT | Performed by: FAMILY MEDICINE

## 2017-07-07 PROCEDURE — 36415 COLL VENOUS BLD VENIPUNCTURE: CPT | Performed by: FAMILY MEDICINE

## 2017-07-07 ASSESSMENT — PAIN SCALES - GENERAL: PAINLEVEL: NO PAIN (0)

## 2017-07-07 NOTE — LETTER
[facility]  [address] [place] [address] [phone]      July 7, 2017      Sofia Freeman  [address] [facility] [address]          Dear Ms. Freeman    The results of your recent lab tests were within normal limits. Enclosed is a copy of these results.  If you have any further questions or problems, please contact our office.    Sincerely,      Danie Galvan MD/ sd    Results for orders placed or performed in visit on 07/07/17   Basic metabolic panel   Result Value Ref Range    Sodium 135 133 - 144 mmol/L    Potassium 3.6 3.4 - 5.3 mmol/L    Chloride 98 94 - 109 mmol/L    Carbon Dioxide 31 20 - 32 mmol/L    Anion Gap 6 3 - 14 mmol/L    Glucose 79 70 - 99 mg/dL    Urea Nitrogen 23 7 - 30 mg/dL    Creatinine 0.71 0.52 - 1.04 mg/dL    GFR Estimate 81 >60 mL/min/1.7m2    GFR Estimate If Black >90   GFR Calc   >60 mL/min/1.7m2    Calcium 9.7 8.5 - 10.1 mg/dL

## 2017-07-07 NOTE — MR AVS SNAPSHOT
"              After Visit Summary   7/7/2017    Sofia Freeman    MRN: [id number]           Patient Information     Date Of Birth          1945        Visit Information        Provider Department      7/7/2017 9:00 AM Danie Galvan MD [facility]        Today's Diagnoses     Hypokalemia    -  1    Myalgia        Hypertension goal BP (blood pressure) < 140/90        Hyperlipidemia LDL goal <130          Care Instructions    ASSESSMENT:   (E87.6) Hypokalemia  (primary encounter diagnosis)  Comment: low potassium probably from hydrochlorothiazide blood pressure medication.    Plan: Basic metabolic panel        Recheck today.   If potassium remains low, eating food with higher potassium can help as could adding potassium supplement.    Sometimes we change blood pressure medication.     (M79.1) Myalgia  Comment: Not sure about cause,  Possibly from low potassium.  Could be muscular-ligamentous.   I doubt this is from atorvastatin.   Plan: IF aches come back, let me know.     (I10) Hypertension goal BP (blood pressure) < 140/90  Comment: doing well currently  Plan: No change in current treatment plan.     (E78.5) Hyperlipidemia LDL goal <130  Comment: You are on atorvastatin for \"primary prevention' of heart attacks and strokes.  New cholesterol guidelines (from AHA/ACC pooled risk calculation) estimates 10 year risk of having a heart attack at about 16%.  Any risk over 7.5% is considered significant and statin type medication is recommended to prevent heart attacks.   Optimizing all risk factors including taking statin medication, good blood pressure control, good diabetes mellitus control for those who have diabetes mellitus and not smoking  Could reduce the cardiovascular risk to 10%.     Plan: Taking the atorvastatin is weighting risks of side effects and possible benefits of preventing heart attacks.  I don't thins the atorvastatin is causing current symptoms and probably good to " continue the medication.       2015 UpToDate     High potassium content foods    Highest content (>25 meq/100 g)  Dried figs  Molasses  Seaweed    Very high content (>12.5 meq/100 g)  Dried fruits (dates, prunes)  Nuts  Avocados  Bran cereals  Wheat germ  Lima beans    High content (>6.2 meq/100 g)  Vegetables  Spinach  Tomatoes  Broccoli  Winter squash  Beets  Carrots  Cauliflower  Potatoes  Fruits  Bananas  Cantaloupe  Kiwis  Oranges  Mangos  Meats  Ground beef  Steak  Pork  Veal  Grijalva  Adapted from: Shonda GROSS. Hypokalemia. N Engl J Med 1998; 339:451.           Follow-ups after your visit        Who to contact     If you have questions or need follow up information about today's clinic visit or your schedule please contact [facility] directly at [phone].  Normal or non-critical lab and imaging results will be communicated to you by MyChart, letter or phone within 4 business days after the clinic has received the results. If you do not hear from us within 7 days, please contact the clinic through MyChart or phone. If you have a critical or abnormal lab result, we will notify you by phone as soon as possible.  Submit refill requests through [employer] or call your pharmacy and they will forward the refill request to us. Please allow 3 business days for your refill to be completed.          Additional Information About Your Visit        Care EveryWhere ID     This is your Care EveryWhere ID. This could be used by other organizations to access your [facility] medical records  [id number]        Your Vitals Were     Pulse Temperature Respirations Breastfeeding? BMI (Body Mass Index)       82 98.8  F (37.1  C) (Tympanic) 18 No 28.19 kg/m2        Blood Pressure from Last 3 Encounters:   07/07/17 120/80   07/02/17 131/85   05/22/17 110/70    Weight from Last 3 Encounters:   07/07/17 172 lb (78 kg)   07/02/17 170 lb (77.1 kg)   05/22/17 175 lb (79.4 kg)              We Performed the Following     Basic  metabolic panel        Primary Care Provider Office Phone # Fax #    Danie Galvan [phone]       [facility] [address] [facility] [address]        Equal Access to Services     REJI WILSON : Hadii aad ku hadnamo Sosuriali, waaxda luqadaha, qaybta kaalmada adenoéyada, layla burroughsfransico stapletonnoé santa chele esteves. So [facility] [phone].    ATENCIÓN: Si habla español, tiene a shine disposición servicios gratuitos de asistencia lingüística. Llame al [phone].    We comply with applicable federal civil rights laws and [place] laws. We do not discriminate on the basis of race, color, national origin, age, disability sex, sexual orientation or gender identity.            Thank you!     Thank you for choosing [facility]  for your care. Our goal is always to provide you with excellent care. Hearing back from our patients is one way we can continue to improve our services. Please take a few minutes to complete the written survey that you may receive in the mail after your visit with us. Thank you!             Your Updated Medication List - Protect others around you: Learn how to safely use, store and throw away your medicines at www.disposemymeds.org.          This list is accurate as of: 7/7/17 10:03 AM.  Always use your most recent med list.                   Brand Name Dispense Instructions for use Diagnosis    amLODIPine 2.5 MG tablet    NORVASC    90 tablet    Take 1 tablet (2.5 mg) by mouth daily    Essential hypertension with goal blood pressure less than 140/90       aspirin 81 MG tablet     100 tablet    Take 1 tablet (81 mg) by mouth daily    Unspecified essential hypertension       atorvastatin 20 MG tablet    LIPITOR    45 tablet    Take 0.5 tablets (10 mg) by mouth daily    Hyperlipidemia LDL goal <130       diclofenac 75 MG EC tablet    VOLTAREN    30 tablet    Take 1 tablet (75 mg) by mouth 2 times daily as needed for moderate pain         hydrochlorothiazide 25 MG tablet    HYDRODIURIL    90 tablet    Take 1 tablet (25 mg) by mouth daily    Essential hypertension with goal blood pressure less than 140/90       oxyCODONE-acetaminophen 5-325 MG per tablet    PERCOCET    10 tablet    Take 1-2 tablets by mouth every 6 hours as needed for moderate to severe pain        THERAPEUTIC MULTIVIT/MINERAL Tabs      1 TABLET BY MOUTH DAILY        valACYclovir 500 MG tablet    VALTREX    6 tablet    Take 1 tablet (500 mg) by mouth 2 times daily    Herpes simplex vulvovaginitis

## 2017-07-07 NOTE — PROGRESS NOTES
SUBJECTIVE:                                                    Sofia Freeman is a 71 year old female who presents to clinic today for the following health issues:  Chief Complaint   Patient presents with     ER F/U     [place]- 7/2/2017      ED/UC Followup:    Facility:  [facility]  Date of visit: 7/2/2017  Reason for visit: myalgia, back pain and low potassium  Current Status: improved. Eating high potassium foods- needs level recheck today.     She had crampy feeling in muscles. Seemed to start in bilateral back went down to both legs and up to abdomen.  NO trigger known.  She went to ED-see notes below.  Was thought to have muscular-ligamentous cause but potassium was low and replaced IV.    Since ED has been feeling well.   Taking it easy the past few days.   No further cramping.    Did not take any medication for this.  Was taking acetaminophen as needed.   She notes occasional nausea for a couple weeks.  It has been mild.     Seen in May for some atypical chest pains.  Has had episodes which awake her at night.  She recalls having one since May.  She has had no exertional pains.   occasional heartburn.  No waterbrash.  Has not been taking antacid medication.     Feels she is getting more forgetful.  Walking into a room and forgetting why.  Was confused in ED for a time.  She thinks it was related to medication given.  Has not been lost.  Still manages checkbook and finances.     Patient Active Problem List   Diagnosis     Genital herpes     Hypertension goal BP (blood pressure) < 140/90     HYPERLIPIDEMIA LDL GOAL <130      OBJECTIVE:Blood pressure 120/80, pulse 82, temperature 98.8  F (37.1  C), temperature source Tympanic, resp. rate 18, weight 172 lb (78 kg), not currently breastfeeding. BMI=Body mass index is 28.19 kg/(m^2).  GENERAL APPEARANCE ADULT: Alert, no acute distress     ASSESSMENT:   (E87.6) Hypokalemia  (primary encounter diagnosis)  Comment: low potassium probably from hydrochlorothiazide blood  "pressure medication.    Plan: Basic metabolic panel        Recheck today.   If potassium remains low, eating food with higher potassium can help as could adding potassium supplement.    Sometimes we change blood pressure medication.     (M79.1) Myalgia  Comment: Not sure about cause,  Possibly from low potassium.  Could be muscular-ligamentous.   I doubt this is from atorvastatin.   Plan: IF aches come back, let me know.     (I10) Hypertension goal BP (blood pressure) < 140/90  Comment: doing well currently  Plan: No change in current treatment plan.     (E78.5) Hyperlipidemia LDL goal <130  Comment: You are on atorvastatin for \"primary prevention' of heart attacks and strokes.  New cholesterol guidelines (from AHA/ACC pooled risk calculation) estimates 10 year risk of having a heart attack at about 16%.  Any risk over 7.5% is considered significant and statin type medication is recommended to prevent heart attacks.   Optimizing all risk factors including taking statin medication, good blood pressure control, good diabetes mellitus control for those who have diabetes mellitus and not smoking  Could reduce the cardiovascular risk to 10%.     Plan: Taking the atorvastatin is weighting risks of side effects and possible benefits of preventing heart attacks.  I don't thins the atorvastatin is causing current symptoms and probably good to continue the medication.              =============================================  ED notes 7/2:  HPI  Sofia Freeman is a 71 year old female, past medical history significant for hypertension, hyperlipidemia, migraine headaches, presents the emergency department with concerns of bilateral low back pain and an anterior abdominal pain with radiation down her legs beginning this morning upon awakening.  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  He felt fine going to bed last night.  When she woke this morning she had a dull aching sensation in her bilateral low back down both legs both anteriorly and posteriorly and in her abdomen as well.  This is progressed over the course of the day to more of a cramping sensation which is primarily centered around her legs.  There is no associated nausea or vomiting.  No fever chills or sweats.  She notes no change in bowel habits and denies constipation or diarrhea.  No urinary tract symptoms such as frequency urgency or dysuria.  He has taken no medication for this.  She is worried about the possibility of her statin medication affecting muscles specifically.     Assessments & Plan (with Medical Decision Making)   71-year-old female past medical history reviewed above, presents to the emergency department with sensation of abdominal low back and lower extremity cramping and difficulties walking as described in the HPI.  Relatively acute onset without apparent clearcut precipitated.  The patient does heavy lifting or straining pending on a routine basis but could not recall anything out of the ordinary for her recently.  She improved somewhat with initial management but not completely and alternate differential diagnoses were considered for explanation of her pain and for that reason imaging studies were obtained of the abdomen and pelvis and low back.  No acute process was identified however marked degenerative changes of the spine were noted.  The patient developed a transient episode of confusion which was likely in hindsight related to awakening in a strange place; no concerning etiology was found at her mental status normalized.  The patient had not received narcotic medications nor antinausea medications that could potentially altered mental status.  She had only received NSAID class  medication which I would not expect would cause a change in her mental status.  Her potassium was noted to be low and initial replacement performed IV in the emergency department and dietary recommendations made.  Follow-up on the potassium and a recheck in clinic was also recommended in the next 7-10 days.  I suspect that the etiology of the patient's discomfort in the emergency department is in fact muscular skeletal, given her age and the significant robust physical exertion she performs on a daily basis I suspect that this is a combination of aging and heavy physical activity that has led to her presentation.  I discussed this E with her.  If she does not continue to improve with appropriate NSAID and as needed narcotic pain medication over the next several days with decreased activity I recommended either return to the emergency department or follow up in clinic.

## 2017-07-07 NOTE — NURSING NOTE
"Chief Complaint   Patient presents with     ER F/U     [place]- 7/2/2017       Initial /80 (BP Location: Right arm, Patient Position: Chair, Cuff Size: Adult Large)  Pulse 82  Temp 98.8  F (37.1  C) (Tympanic)  Resp 18  Wt 172 lb (78 kg)  Breastfeeding? No  BMI 28.19 kg/m2 Estimated body mass index is 28.19 kg/(m^2) as calculated from the following:    Height as of 5/22/17: 5' 5.5\" (1.664 m).    Weight as of this encounter: 172 lb (78 kg).  Medication Reconciliation: complete    Health Maintenance that is potentially due pending provider review:  NONE    N/a  Evelin Muñoz CMA      "

## 2017-07-07 NOTE — PATIENT INSTRUCTIONS
"ASSESSMENT:   (E87.6) Hypokalemia  (primary encounter diagnosis)  Comment: low potassium probably from hydrochlorothiazide blood pressure medication.    Plan: Basic metabolic panel        Recheck today.   If potassium remains low, eating food with higher potassium can help as could adding potassium supplement.    Sometimes we change blood pressure medication.     (M79.1) Myalgia  Comment: Not sure about cause,  Possibly from low potassium.  Could be muscular-ligamentous.   I doubt this is from atorvastatin.   Plan: IF aches come back, let me know.     (I10) Hypertension goal BP (blood pressure) < 140/90  Comment: doing well currently  Plan: No change in current treatment plan.     (E78.5) Hyperlipidemia LDL goal <130  Comment: You are on atorvastatin for \"primary prevention' of heart attacks and strokes.  New cholesterol guidelines (from AHA/ACC pooled risk calculation) estimates 10 year risk of having a heart attack at about 16%.  Any risk over 7.5% is considered significant and statin type medication is recommended to prevent heart attacks.   Optimizing all risk factors including taking statin medication, good blood pressure control, good diabetes mellitus control for those who have diabetes mellitus and not smoking  Could reduce the cardiovascular risk to 10%.     Plan: Taking the atorvastatin is weighting risks of side effects and possible benefits of preventing heart attacks.  I don't thins the atorvastatin is causing current symptoms and probably good to continue the medication.       2015 UpToDate     High potassium content foods    Highest content (>25 meq/100 g)  Dried figs  Molasses  Seaweed    Very high content (>12.5 meq/100 g)  Dried fruits (dates, prunes)  Nuts  Avocados  Bran cereals  Wheat germ  Lima beans    High content (>6.2 meq/100 g)  Vegetables  Spinach  Tomatoes  Broccoli  Winter squash  Beets  Carrots  Cauliflower  Potatoes  Fruits  Bananas  Cantaloupe  Kiwis  Oranges  Mangos  Meats  Ground " beef  Steak  Pork  Veal  Grijalva  Adapted from: Shonda GROSS. Hypokalemia. N Engl J Med 1998; 339:451.

## 2017-07-07 NOTE — PROGRESS NOTES
Please call.  The blood chemistries (Basic metabolic panel) are all normal including electrolytes (salt balances in the blood), blood glucose and kidney tests.

## 2017-10-20 ENCOUNTER — OFFICE VISIT (OUTPATIENT)
Dept: URGENT CARE | Facility: URGENT CARE | Age: 72
End: 2017-10-20
Payer: COMMERCIAL

## 2017-10-20 DIAGNOSIS — M94.0 COSTOCHONDRITIS: Primary | ICD-10-CM

## 2017-10-20 DIAGNOSIS — R07.81 RIB PAIN: ICD-10-CM

## 2017-10-20 PROCEDURE — 93000 ELECTROCARDIOGRAM COMPLETE: CPT | Performed by: NURSE PRACTITIONER

## 2017-10-20 PROCEDURE — 99213 OFFICE O/P EST LOW 20 MIN: CPT | Performed by: NURSE PRACTITIONER

## 2017-10-20 NOTE — MR AVS SNAPSHOT
After Visit Summary   10/20/2017    Sofia Freeman    MRN: [id number]           Patient Information     Date Of Birth          1945        Visit Information        Provider Department      10/20/2017 6:10 PM Cielo Morse APRN [facility]        Today's Diagnoses     Costochondritis    -  1    Rib pain          Care Instructions    Take Aleve morning and evening as directed per package with food OR Ibuprofen 400 mg 3-4 times daily with food. Do not take both.    Arnica gel can also be applied topically for comfort. This is over the counter.    If symptoms worsen or do not resolve follow up with your primary care provider. If emergent go to the ER.      Indications for emergent return to emergency department discussed with patient, who verbalized good understanding and agreement.  Patient understands the limitations of today's evaluation.         Chest Wall Pain: Costochondritis    The chest pain that you have had today is caused by costochondritis. This condition is caused by an inflammation of the cartilage joining your ribs to your breastbone. It is not caused by heart or lung problems. Your healthcare team has made sure that the chest pain you feel is not from a life threatening cause of chest pain such as heart attack, collapsed lung, blood clot in the lung, tear in the aorta, or esophageal rupture. The inflammation may have been brought on by a blow to the chest, lifting heavy objects, intense exercise, or an illness that made you cough and sneeze a lot. It often occurs during times of emotional stress. It can be painful, but it is not dangerous. It usually goes away in 1 to 2 weeks. But it may happen again. Rarely, a more serious condition may cause symptoms similar to costochondritis. That s why it s important to watch for the warning signs listed below.  Home care  Follow these guidelines when caring for yourself at home:    If you feel that  emotional stress is a cause of your condition, try to figure out the sources of that stress. It may not be obvious. Learn ways to deal with the stress in your life. This can include regular exercise, muscle relaxation, meditation, or simply taking time out for yourself.    You may use acetaminophen, ibuprofen, or naproxen to control pain, unless another pain medicine was prescribed. If you have liver or kidney disease or ever had a stomach ulcer, talk with your healthcare provider before using these medicines.    You can also help ease pain by using a hot, wet compress or heating pad. Use this with or without a medicated skin cream that helps relieves pain.    Do stretching exercise as advised by your provider.    Take any prescribed medicines as directed.  Follow-up care  Follow up with your healthcare provider, or as advised, if you do not start to get better in the next 2 days.  When to seek medical advice  Call your healthcare provider right away if any of these occur:    A change in the type of pain. Call if it feels different, becomes more serious, lasts longer, or spreads into your shoulder, arm, neck, jaw, or back.    Shortness of breath or pain gets worse when you breathe    Weakness, dizziness, or fainting    Cough with dark-colored sputum (phlegm) or blood    Abdominal pain    Dark red or black stools    Fever of 100.4 F (38 C) or higher, or as directed by your healthcare provider  Date Last Reviewed: 12/1/2016 2000-2017 [employer]. [address] [place], [place] [id number]. All rights reserved. This information is not intended as a substitute for professional medical care. Always follow your healthcare professional's instructions.        Costochondritis    Costochondritis is inflammation of a rib or the cartilage that connects a rib to your breastbone (sternum). It causes tenderness, and sometimes chest pain may be sharp or aching, or it may feel like pressure. Pain may get worse with  deep breathing, movement, or exercise. In some cases, the pain is mistaken for a heart attack. Despite this, the condition is not serious. Read on to learn more about the condition and how it can be treated.  What causes costochondritis?  The cause of costochondritis is not completely clear, but it may happen after a chest injury, chest infection, or coughing episode. Some physical activities can sometimes lead to costochondritis. Large-breasted women may be more likely to have the condition. Often, the reason for the inflammation is unknown.  Diagnosing costochondritis  There is no test for costochondritis. The condition is diagnosed by the symptoms you have. Your healthcare provider will perform a physical exam. He or she will ask you about your symptoms and examine your chest for tenderness. In some cases, tests are done to rule out more serious problems. These tests may include imaging tests such as chest X-ray, CT scan, or an ECG.  Treating costochondritis  If an underlying cause is found, treatment for that will likely relieve the problem. Costochondritis often goes away on its own. The course of the condition varies from person to person. It usually lasts from weeks to months. In some cases, mild symptoms continue for months to years. To ease symptoms:    Take medicine as directed. These relieve pain and swelling. Ibuprofen or other NSAIDs are often recommended. In some cases, you may be given prescription medicine, such as muscle relaxants.    Avoid activities that put stress on the chest or spine.    Apply a heating pad (set to warm, not too high, heat) to the breastbone several times a day.    Perform stretching exercises as directed.  Call the healthcare provider right away if you have any of the following:    Pain that is not relieved by medicine    Shortness of breath    Lightheadedness, dizziness, or fainting    Feeling of irregular heartbeat or fast pulse  Anyone with chest pain should see a  healthcare provider, especially those who are older and may be at risk for heart disease.   Date Last Reviewed: 10/1/2016    0766-7916 [employer]. [address] [place] [address], [place] [address]. All rights reserved. This information is not intended as a substitute for professional medical care. Always follow your healthcare professional's instructions.                Follow-ups after your visit        Follow-up notes from your care team     See patient instructions section of the AVS      Your next 10 appointments already scheduled     Oct 30, 2017 10:00 AM CDT   MA SCREENING DIGITAL BILATERAL with [facility]MA[facility]   [facility]    [address] [place] [id number]   [phone]           Do not use any powder, lotion or deodorant under your arms or on your breast. If you do, we will ask you to remove it before your exam.  Wear comfortable, two-piece clothing.  If you have any allergies, tell your care team.  Bring any previous mammograms from other facilities or have them mailed to the breast center.              Who to contact     If you have questions or need follow up information about today's clinic visit or your schedule please contact [facility] directly at [phone].  Normal or non-critical lab and imaging results will be communicated to you by MyChart, letter or phone within 4 business days after the clinic has received the results. If you do not hear from us within 7 days, please contact the clinic through [facility] [employer] or phone. If you have a critical or abnormal lab result, we will notify you by phone as soon as possible.  Submit refill requests through [employer] or call your pharmacy and they will forward the refill request to us. Please allow 3 business days for your refill to be completed.          Additional Information About Your Visit        [employer] Information     [employer] lets you send messages to your  "doctor, view your test results, renew your prescriptions, schedule appointments and more. To sign up, go to [email or web address] . Click on \"Log in\" on the left side of the screen, which will take you to the Welcome page. Then click on \"Sign up Now\" on the right side of the page.     You will be asked to enter the access code listed below, as well as some personal information. Please follow the directions to create your username and password.     Your access code is: [email or web address]  Expires: 2018  8:06 PM     Your access code will  in 90 days. If you need help or a new code, please call your [place] clinic or [phone].        Care EveryWhere ID     This is your Care EveryWhere ID. This could be used by other organizations to access your [place] medical records  [id number]         Blood Pressure from Last 3 Encounters:   17 120/80   17 131/85   17 110/70    Weight from Last 3 Encounters:   17 172 lb (78 kg)   17 170 lb (77.1 kg)   17 175 lb (79.4 kg)              We Performed the Following     EKG 12-lead complete w/read - Clinics        Primary Care Provider Office Phone # Fax #    Danie Galvan [phone] [address] [id number]        Equal Access to Services     [facility]: Hadii aad ku hadasho Soomaali, waaxda luqadaha, qaybta kaalmada adenoéyada, layla elaine . So [facility] [phone].    ATENCIÓN: Si habla español, tiene a shine disposición servicios gratuitos de asistencia lingüística. Llame al [phone].    We comply with applicable federal civil rights laws and [place] laws. We do not discriminate on the basis of race, color, national origin, age, disability, sex, sexual orientation, or gender identity.            Thank you!     Thank you for choosing [facility]  for your care. Our goal is always to provide you with excellent care. Hearing back from " our patients is one way we can continue to improve our services. Please take a few minutes to complete the written survey that you may receive in the mail after your visit with us. Thank you!             Your Updated Medication List - Protect others around you: Learn how to safely use, store and throw away your medicines at www.disposemymeds.org.          This list is accurate as of: 10/20/17  8:06 PM.  Always use your most recent med list.                   Brand Name Dispense Instructions for use Diagnosis    amLODIPine 2.5 MG tablet    NORVASC    90 tablet    Take 1 tablet (2.5 mg) by mouth daily    Essential hypertension with goal blood pressure less than 140/90       aspirin 81 MG tablet     100 tablet    Take 1 tablet (81 mg) by mouth daily    Unspecified essential hypertension       atorvastatin 20 MG tablet    LIPITOR    45 tablet    Take 0.5 tablets (10 mg) by mouth daily    Hyperlipidemia LDL goal <130       diclofenac 75 MG EC tablet    VOLTAREN    30 tablet    Take 1 tablet (75 mg) by mouth 2 times daily as needed for moderate pain        hydrochlorothiazide 25 MG tablet    HYDRODIURIL    90 tablet    Take 1 tablet (25 mg) by mouth daily    Essential hypertension with goal blood pressure less than 140/90       oxyCODONE-acetaminophen 5-325 MG per tablet    PERCOCET    10 tablet    Take 1-2 tablets by mouth every 6 hours as needed for moderate to severe pain        THERAPEUTIC MULTIVIT/MINERAL Tabs      1 TABLET BY MOUTH DAILY        valACYclovir 500 MG tablet    VALTREX    6 tablet    Take 1 tablet (500 mg) by mouth 2 times daily    Herpes simplex vulvovaginitis

## 2017-10-21 NOTE — PATIENT INSTRUCTIONS
Take Aleve morning and evening as directed per package with food OR Ibuprofen 400 mg 3-4 times daily with food. Do not take both.    Arnica gel can also be applied topically for comfort. This is over the counter.    If symptoms worsen or do not resolve follow up with your primary care provider. If emergent go to the ER.      Indications for emergent return to emergency department discussed with patient, who verbalized good understanding and agreement.  Patient understands the limitations of today's evaluation.         Chest Wall Pain: Costochondritis    The chest pain that you have had today is caused by costochondritis. This condition is caused by an inflammation of the cartilage joining your ribs to your breastbone. It is not caused by heart or lung problems. Your healthcare team has made sure that the chest pain you feel is not from a life threatening cause of chest pain such as heart attack, collapsed lung, blood clot in the lung, tear in the aorta, or esophageal rupture. The inflammation may have been brought on by a blow to the chest, lifting heavy objects, intense exercise, or an illness that made you cough and sneeze a lot. It often occurs during times of emotional stress. It can be painful, but it is not dangerous. It usually goes away in 1 to 2 weeks. But it may happen again. Rarely, a more serious condition may cause symptoms similar to costochondritis. That s why it s important to watch for the warning signs listed below.  Home care  Follow these guidelines when caring for yourself at home:    If you feel that emotional stress is a cause of your condition, try to figure out the sources of that stress. It may not be obvious. Learn ways to deal with the stress in your life. This can include regular exercise, muscle relaxation, meditation, or simply taking time out for yourself.    You may use acetaminophen, ibuprofen, or naproxen to control pain, unless another pain medicine was prescribed. If you have  liver or kidney disease or ever had a stomach ulcer, talk with your healthcare provider before using these medicines.    You can also help ease pain by using a hot, wet compress or heating pad. Use this with or without a medicated skin cream that helps relieves pain.    Do stretching exercise as advised by your provider.    Take any prescribed medicines as directed.  Follow-up care  Follow up with your healthcare provider, or as advised, if you do not start to get better in the next 2 days.  When to seek medical advice  Call your healthcare provider right away if any of these occur:    A change in the type of pain. Call if it feels different, becomes more serious, lasts longer, or spreads into your shoulder, arm, neck, jaw, or back.    Shortness of breath or pain gets worse when you breathe    Weakness, dizziness, or fainting    Cough with dark-colored sputum (phlegm) or blood    Abdominal pain    Dark red or black stools    Fever of 100.4 F (38 C) or higher, or as directed by your healthcare provider  Date Last Reviewed: 12/1/2016 2000-2017 [employer]. [address] [place] [address]. All rights reserved. This information is not intended as a substitute for professional medical care. Always follow your healthcare professional's instructions.        Costochondritis    Costochondritis is inflammation of a rib or the cartilage that connects a rib to your breastbone (sternum). It causes tenderness, and sometimes chest pain may be sharp or aching, or it may feel like pressure. Pain may get worse with deep breathing, movement, or exercise. In some cases, the pain is mistaken for a heart attack. Despite this, the condition is not serious. Read on to learn more about the condition and how it can be treated.  What causes costochondritis?  The cause of costochondritis is not completely clear, but it may happen after a chest injury, chest infection, or coughing episode. Some physical activities can  sometimes lead to costochondritis. Large-breasted women may be more likely to have the condition. Often, the reason for the inflammation is unknown.  Diagnosing costochondritis  There is no test for costochondritis. The condition is diagnosed by the symptoms you have. Your healthcare provider will perform a physical exam. He or she will ask you about your symptoms and examine your chest for tenderness. In some cases, tests are done to rule out more serious problems. These tests may include imaging tests such as chest X-ray, CT scan, or an ECG.  Treating costochondritis  If an underlying cause is found, treatment for that will likely relieve the problem. Costochondritis often goes away on its own. The course of the condition varies from person to person. It usually lasts from weeks to months. In some cases, mild symptoms continue for months to years. To ease symptoms:    Take medicine as directed. These relieve pain and swelling. Ibuprofen or other NSAIDs are often recommended. In some cases, you may be given prescription medicine, such as muscle relaxants.    Avoid activities that put stress on the chest or spine.    Apply a heating pad (set to warm, not too high, heat) to the breastbone several times a day.    Perform stretching exercises as directed.  Call the healthcare provider right away if you have any of the following:    Pain that is not relieved by medicine    Shortness of breath    Lightheadedness, dizziness, or fainting    Feeling of irregular heartbeat or fast pulse  Anyone with chest pain should see a healthcare provider, especially those who are older and may be at risk for heart disease.   Date Last Reviewed: 10/1/2016    6051-8250 [employer]. [address] [place] [address], [place] [address]. All rights reserved. This information is not intended as a substitute for professional medical care. Always follow your healthcare professional's instructions.

## 2017-10-21 NOTE — PROGRESS NOTES
SUBJECTIVE:   Sofia Freeman is a 72 year old female who presents to clinic today for the following health issues:    Concern - Rib pain under breast   Onset: today     Description:   Patient is having pain in her ribs on left side.    Having stabbing on her left side.     Intensity: mild    Progression of Symptoms:  same    Accompanying Signs & Symptoms:  Na      Previous history of similar problem:   Na     Precipitating factors:   Worsened by: moving     Alleviating factors:  Improved by: na     Therapies Tried and outcome: none    Requesting EKG      Problem list and histories reviewed & adjusted, as indicated.  Additional history: as documented    Patient Active Problem List   Diagnosis     Genital herpes     Hypertension goal BP (blood pressure) < 140/90     HYPERLIPIDEMIA LDL GOAL <130     Past Surgical History:   Procedure Laterality Date     APPENDECTOMY OPEN CHILD  1965    Appendectomy     COLONOSCOPY  1/6/2005    colonoscopy-normal repeat in 10 years     ESOPHAGOSCOPY, GASTROSCOPY, DUODENOSCOPY (EGD), COMBINED  9/21/2012    Procedure: COMBINED ESOPHAGOSCOPY, GASTROSCOPY, DUODENOSCOPY (EGD);  Gastroscopy;  Surgeon: Dorothy Hill MD;  Location: [facility]     PHACOEMULSIFICATION WITH STANDARD INTRAOCULAR LENS IMPLANT  8/18/2011    Procedure:PHACOEMULSIFICATION WITH STANDARD INTRAOCULAR LENS IMPLANT; Surgeon:JAY URBINA; Location:[facility]     PHACOEMULSIFICATION WITH STANDARD INTRAOCULAR LENS IMPLANT  9/1/2011    Procedure:PHACOEMULSIFICATION WITH STANDARD INTRAOCULAR LENS IMPLANT; Left Phacoemulsification with standard intraocular lens implant; Surgeon:JAY URBINA; Location:[facility]       Social History   Substance Use Topics     Smoking status: Never Smoker     Smokeless tobacco: Never Used     Alcohol use No     Family History   Problem Relation Age of Onset     Hypertension Mother      Arthritis Mother      Eye Disorder Mother      glaucoma     HEART DISEASE Father      Alzheimer  Disease Father      Hypertension Father      CEREBROVASCULAR DISEASE Maternal Grandmother      Alzheimer Disease Maternal Grandmother      DIABETES Maternal Grandmother      Alcohol/Drug Maternal Grandfather      Eye Disorder Sister      glaucoma     Arthritis Sister      Eye Disorder Sister      glaucoma     Hypertension Sister      CANCER Sister      Pancreatic          Current Outpatient Prescriptions   Medication Sig Dispense Refill     diclofenac (VOLTAREN) 75 MG EC tablet Take 1 tablet (75 mg) by mouth 2 times daily as needed for moderate pain 30 tablet 0     oxyCODONE-acetaminophen (PERCOCET) 5-325 MG per tablet Take 1-2 tablets by mouth every 6 hours as needed for moderate to severe pain 10 tablet 0     atorvastatin (LIPITOR) 20 MG tablet Take 0.5 tablets (10 mg) by mouth daily 45 tablet 3     hydrochlorothiazide (HYDRODIURIL) 25 MG tablet Take 1 tablet (25 mg) by mouth daily 90 tablet 3     amLODIPine (NORVASC) 2.5 MG tablet Take 1 tablet (2.5 mg) by mouth daily 90 tablet 3     valACYclovir (VALTREX) 500 MG tablet Take 1 tablet (500 mg) by mouth 2 times daily (Patient not taking: Reported on 5/22/2017) 6 tablet 3     aspirin 81 MG tablet Take 1 tablet (81 mg) by mouth daily 100 tablet 6     THERAPEUTIC MULTIVIT/MINERAL OR TABS 1 TABLET BY MOUTH DAILY       Allergies   Allergen Reactions     Nkda [No Known Drug Allergies]      Labs reviewed in EPIC      Reviewed and updated as needed this visit by clinical staffAllergies  Meds  Problems       Reviewed and updated as needed this visit by Provider  Allergies  Meds  Problems         ROS:  Constitutional, HEENT, cardiovascular, pulmonary, GI, , musculoskeletal, neuro, skin, endocrine and psych systems are negative, except as otherwise noted.      OBJECTIVE:   There were no vitals taken for this visit.  There is no height or weight on file to calculate BMI.   GENERAL: healthy, alert and no distress, nontoxic in appearance  EYES: Eyes grossly normal to  inspection, PERRL and conjunctivae and sclerae normal  HENT: ear canals and TM's normal, nose and mouth without ulcers or lesions  NECK: no adenopathy, no asymmetry, masses, or scars and thyroid normal to palpation  RESP: lungs clear to auscultation - no rales, rhonchi or wheezes Pt has pain across entire chest over ribs and with movement and breathing and with palpation, no respiratory distress  CV: regular rate and rhythm, normal S1 S2, no S3 or S4, no murmur, click or rub, no peripheral edema   ABDOMEN: soft, nontender, no hepatosplenomegaly, no masses and bowel sounds normal  MS: no gross musculoskeletal defects noted, no edema    Diagnostic Test Results:EKG NSR with no acute changes  No results found for this or any previous visit (from the past 24 hour(s)).    ASSESSMENT/PLAN:   Rib pain with movement and breathing. Strained entire rib cage pushing a 500 pound hay elevator. She was afraid she had a heart issue due to severe pain under left breast. 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 Entire ribcage in front on both sides is sore to the touch. Very consistent with costochondritis and feel very comfortable this is noncardiac. EKG was done per pt request and was NSR.  Problem List Items Addressed This Visit     None      Visit Diagnoses     Costochondritis    -  Primary    Rib pain        Relevant Orders    EKG 12-lead complete w/read - Clinics (Completed)               Patient Instructions     Take Aleve morning and evening as directed per package with food OR Ibuprofen 400 mg 3-4 times daily with food. Do not take both.    Arnica gel can also be applied topically for comfort. This is over the counter.    If symptoms worsen or do not resolve follow up with your primary care provider. If emergent go to the ER.      Indications for emergent return to emergency department discussed with patient, who verbalized good understanding and agreement.  Patient understands the limitations of today's evaluation.          Chest Wall Pain: Costochondritis    The chest pain that you have had today is caused by costochondritis. This condition is caused by an inflammation of the cartilage joining your ribs to your breastbone. It is not caused by heart or lung problems. Your healthcare team has made sure that the chest pain you feel is not from a life threatening cause of chest pain such as heart attack, collapsed lung, blood clot in the lung, tear in the aorta, or esophageal rupture. The inflammation may have been brought on by a blow to the chest, lifting heavy objects, intense exercise, or an illness that made you cough and sneeze a lot. It often occurs during times of emotional stress. It can be painful, but it is not dangerous. It usually goes away in 1 to 2 weeks. But it may happen again. Rarely, a more serious condition may cause symptoms similar to costochondritis. That s why it s important to watch for the warning signs listed below.  Home care  Follow these guidelines when caring for yourself at home:    If you feel that emotional stress is a cause of your condition, try to figure out the sources of that stress. It may not be obvious. Learn ways to deal with the stress in your life. This can include regular exercise, muscle relaxation, meditation, or simply taking time out for yourself.    You may use acetaminophen, ibuprofen, or naproxen to control pain, unless another pain medicine was prescribed. If you have liver or kidney disease or ever had a stomach ulcer, talk with your healthcare provider before using these medicines.    You can also help ease pain by using a hot, wet compress or heating pad. Use this with or without a medicated skin cream that helps relieves pain.    Do stretching exercise as advised by your provider.    Take any prescribed medicines as directed.  Follow-up care  Follow up with your healthcare provider, or as advised, if you do not start to get better in the next 2 days.  When to seek medical  advice  Call your healthcare provider right away if any of these occur:    A change in the type of pain. Call if it feels different, becomes more serious, lasts longer, or spreads into your shoulder, arm, neck, jaw, or back.    Shortness of breath or pain gets worse when you breathe    Weakness, dizziness, or fainting    Cough with dark-colored sputum (phlegm) or blood    Abdominal pain    Dark red or black stools    Fever of 100.4 F (38 C) or higher, or as directed by your healthcare provider  Date Last Reviewed: 12/1/2016 2000-2017 [employer]. [address] [place] [address]. All rights reserved. This information is not intended as a substitute for professional medical care. Always follow your healthcare professional's instructions.        Costochondritis    Costochondritis is inflammation of a rib or the cartilage that connects a rib to your breastbone (sternum). It causes tenderness, and sometimes chest pain may be sharp or aching, or it may feel like pressure. Pain may get worse with deep breathing, movement, or exercise. In some cases, the pain is mistaken for a heart attack. Despite this, the condition is not serious. Read on to learn more about the condition and how it can be treated.  What causes costochondritis?  The cause of costochondritis is not completely clear, but it may happen after a chest injury, chest infection, or coughing episode. Some physical activities can sometimes lead to costochondritis. Large-breasted women may be more likely to have the condition. Often, the reason for the inflammation is unknown.  Diagnosing costochondritis  There is no test for costochondritis. The condition is diagnosed by the symptoms you have. Your healthcare provider will perform a physical exam. He or she will ask you about your symptoms and examine your chest for tenderness. In some cases, tests are done to rule out more serious problems. These tests may include imaging tests such as  chest X-ray, CT scan, or an ECG.  Treating costochondritis  If an underlying cause is found, treatment for that will likely relieve the problem. Costochondritis often goes away on its own. The course of the condition varies from person to person. It usually lasts from weeks to months. In some cases, mild symptoms continue for months to years. To ease symptoms:    Take medicine as directed. These relieve pain and swelling. Ibuprofen or other NSAIDs are often recommended. In some cases, you may be given prescription medicine, such as muscle relaxants.    Avoid activities that put stress on the chest or spine.    Apply a heating pad (set to warm, not too high, heat) to the breastbone several times a day.    Perform stretching exercises as directed.  Call the healthcare provider right away if you have any of the following:    Pain that is not relieved by medicine    Shortness of breath    Lightheadedness, dizziness, or fainting    Feeling of irregular heartbeat or fast pulse  Anyone with chest pain should see a healthcare provider, especially those who are older and may be at risk for heart disease.   Date Last Reviewed: 10/1/2016    7546-9239 [employer]. [address] [place] [address]. All rights reserved. This information is not intended as a substitute for professional medical care. Always follow your healthcare professional's instructions.            JESSICA Armstrong [facility]

## 2017-10-30 ENCOUNTER — RADIANT APPOINTMENT (OUTPATIENT)
Dept: MAMMOGRAPHY | Facility: CLINIC | Age: 72
End: 2017-10-30
Attending: FAMILY MEDICINE
Payer: COMMERCIAL

## 2017-10-30 DIAGNOSIS — Z12.31 VISIT FOR SCREENING MAMMOGRAM: ICD-10-CM

## 2017-10-30 PROCEDURE — G0202 SCR MAMMO BI INCL CAD: HCPCS | Mod: TC

## 2017-10-31 DIAGNOSIS — E78.5 HYPERLIPIDEMIA LDL GOAL <130: ICD-10-CM

## 2017-10-31 RX ORDER — ATORVASTATIN CALCIUM 20 MG/1
TABLET, FILM COATED ORAL
Qty: 45 TABLET | Refills: 3 | Status: SHIPPED | OUTPATIENT
Start: 2017-10-31 | End: 2019-01-22

## 2017-11-15 ENCOUNTER — OFFICE VISIT (OUTPATIENT)
Dept: FAMILY MEDICINE | Facility: CLINIC | Age: 72
End: 2017-11-15
Payer: COMMERCIAL

## 2017-11-15 VITALS
HEART RATE: 82 BPM | RESPIRATION RATE: 16 BRPM | BODY MASS INDEX: 27.97 KG/M2 | WEIGHT: 174 LBS | DIASTOLIC BLOOD PRESSURE: 82 MMHG | HEIGHT: 66 IN | TEMPERATURE: 98.1 F | SYSTOLIC BLOOD PRESSURE: 120 MMHG

## 2017-11-15 DIAGNOSIS — R19.09 GROIN LUMP: Primary | ICD-10-CM

## 2017-11-15 DIAGNOSIS — L73.9 FOLLICULITIS: ICD-10-CM

## 2017-11-15 PROCEDURE — 99213 OFFICE O/P EST LOW 20 MIN: CPT | Performed by: FAMILY MEDICINE

## 2017-11-15 RX ORDER — DOXYCYCLINE 100 MG/1
100 CAPSULE ORAL 2 TIMES DAILY
Qty: 20 CAPSULE | Refills: 0 | Status: SHIPPED | OUTPATIENT
Start: 2017-11-15 | End: 2017-11-25

## 2017-11-15 NOTE — NURSING NOTE
"Chief Complaint   Patient presents with     Mass       Initial /82  Pulse 82  Temp 98.1  F (36.7  C) (Tympanic)  Resp 16  Ht 5' 5.5\" (1.664 m)  Wt 174 lb (78.9 kg)  BMI 28.51 kg/m2 Estimated body mass index is 28.51 kg/(m^2) as calculated from the following:    Height as of this encounter: 5' 5.5\" (1.664 m).    Weight as of this encounter: 174 lb (78.9 kg).  Medication Reconciliation: complete    Health Maintenance that is potentially due pending provider review:          Is there anyone who you would like to be able to receive your results?   If yes have patient fill out DONIS    "

## 2017-11-15 NOTE — MR AVS SNAPSHOT
After Visit Summary   11/15/2017    Sofia Freeman    MRN: [id number]           Patient Information     Date Of Birth          1945        Visit Information        Provider Department      11/15/2017 1:40 PM Danie Galvan MD [facility]        Today's Diagnoses     Groin lump    -  1    Folliculitis          Care Instructions    ASSESSMENT:   (R19.09) Groin lump  (primary encounter diagnosis)  Comment: This might have been and enlarged lymph node (gland).  They enlarge in response to inflammation or infection in their region.  Since it was big and disappeared it suggests nothing worrisome and no treatment needed.  If you have a lump that persists or enlarges over time, have me check it out.    (L73.9) Folliculitis  Comment: Small pimples in hair follicles of scalp.  This is a skin infection.   Plan: doxycycline monohydrate 100 MG capsule        Take antibiotic 100mg twice daily for 10 days.   OK to pop the pimples when they show up.    Recheck if they get bigger, more tender.     Shingles vaccine is recommended for those over 60.  There is a new vaccine just out which may be more effective than the current one.  It would be OK to wait a couple months until we have more information on the new vaccine.            Follow-ups after your visit        Who to contact     If you have questions or need follow up information about today's clinic visit or your schedule please contact [facility] directly at [phone].  Normal or non-critical lab and imaging results will be communicated to you by MyChart, letter or phone within 4 business days after the clinic has received the results. If you do not hear from us within 7 days, please contact the clinic through MyChart or phone. If you have a critical or abnormal lab result, we will notify you by phone as soon as possible.  Submit refill requests through BumpTop or call your pharmacy and they will forward the  "refill request to us. Please allow 3 business days for your refill to be completed.          Additional Information About Your Visit        [employer] Information     [employer] lets you send messages to your doctor, view your test results, renew your prescriptions, schedule appointments and more. To sign up, go to [email or web address] . Click on \"Log in\" on the left side of the screen, which will take you to the Welcome page. Then click on \"Sign up Now\" on the right side of the page.     You will be asked to enter the access code listed below, as well as some personal information. Please follow the directions to create your username and password.     Your access code is: [email or web address]  Expires: 2018  7:06 PM     Your access code will  in 90 days. If you need help or a new code, please call your [facility] clinic or [phone].        Care EveryWhere ID     This is your Care EveryWhere ID. This could be used by other organizations to access your [facility] medical records  [id number]        Your Vitals Were     Pulse Temperature Respirations Height BMI (Body Mass Index)       82 98.1  F (36.7  C) (Tympanic) 16 5' 5.5\" (1.664 m) 28.51 kg/m2        Blood Pressure from Last 3 Encounters:   11/15/17 120/82   17 120/80   17 131/85    Weight from Last 3 Encounters:   11/15/17 174 lb (78.9 kg)   17 172 lb (78 kg)   17 170 lb (77.1 kg)              Today, you had the following     No orders found for display         Today's Medication Changes          These changes are accurate as of: 11/15/17  2:27 PM.  If you have any questions, ask your nurse or doctor.               Start taking these medicines.        Dose/Directions    doxycycline monohydrate 100 MG capsule   Used for:  Folliculitis   Started by:  Danie Galvan MD        Dose:  100 mg   Take 1 capsule (100 mg) by mouth 2 times daily for 10 days   Quantity:  20 capsule   Refills:  0            Where to get your medicines      These " medications were sent to [id number] - [place], [place] - [address]  [address] [address]    Hours:  Closed 10-16-08 business to [facility] Phone:  [phone]     doxycycline monohydrate 100 MG capsule                Primary Care Provider Office Phone # Fax #    Danie Galvan [phone] [address] [place] [address]        Equal Access to Services     REJI WILSON : Hadii aad ku hadasho Soomaali, waaxda luqadaha, qaybta kaalmada adeegyada, waxay idiin hayaan adeeg kharash lazen . So [facility] [phone].    ATENCIÓN: Si habla español, tiene a shine disposición servicios gratuitos de asistencia lingüística. [facility] [phone].    We comply with applicable federal civil rights laws and [place] laws. We do not discriminate on the basis of race, color, national origin, age, disability, sex, sexual orientation, or gender identity.            Thank you!     Thank you for choosing [facility]  for your care. Our goal is always to provide you with excellent care. Hearing back from our patients is one way we can continue to improve our services. Please take a few minutes to complete the written survey that you may receive in the mail after your visit with us. Thank you!             Your Updated Medication List - Protect others around you: Learn how to safely use, store and throw away your medicines at www.disposemymeds.org.          This list is accurate as of: 11/15/17  2:27 PM.  Always use your most recent med list.                   Brand Name Dispense Instructions for use Diagnosis    amLODIPine 2.5 MG tablet    NORVASC    90 tablet    Take 1 tablet (2.5 mg) by mouth daily    Essential hypertension with goal blood pressure less than 140/90       aspirin 81 MG tablet     100 tablet    Take 1 tablet (81 mg) by mouth daily    Unspecified essential hypertension       atorvastatin 20 MG tablet    LIPITOR    45 tablet    TAKE  ONE-HALF TABLET BY MOUTH EVERY DAY    Hyperlipidemia LDL goal <130       diclofenac 75 MG EC tablet    VOLTAREN    30 tablet    Take 1 tablet (75 mg) by mouth 2 times daily as needed for moderate pain        doxycycline monohydrate 100 MG capsule     20 capsule    Take 1 capsule (100 mg) by mouth 2 times daily for 10 days    Folliculitis       hydrochlorothiazide 25 MG tablet    HYDRODIURIL    90 tablet    Take 1 tablet (25 mg) by mouth daily    Essential hypertension with goal blood pressure less than 140/90       oxyCODONE-acetaminophen 5-325 MG per tablet    PERCOCET    10 tablet    Take 1-2 tablets by mouth every 6 hours as needed for moderate to severe pain        THERAPEUTIC MULTIVIT/MINERAL Tabs      1 TABLET BY MOUTH DAILY        valACYclovir 500 MG tablet    VALTREX    6 tablet    Take 1 tablet (500 mg) by mouth 2 times daily    Herpes simplex vulvovaginitis

## 2017-11-15 NOTE — PROGRESS NOTES
"  SUBJECTIVE:   Sofia Freeman is a 72 year old female who presents to clinic today for the following health issues:  Chief Complaint   Patient presents with     Mass      Lump in groin left sided      Duration: 11days    Description (location/character/radiation): Able to move the lump, did go down in size and gone.    Intensity:  7/10 when it first appeared over the night.    Accompanying signs and symptoms: Neck pain, HA and nausea during that time.     History (similar episodes/previous evaluation): None    Precipitating or alleviating factors: None    Therapies tried and outcome: none     Lump was tender.  Now gone.   She did not have any skin problems.   She has had a history of genital herpes.  NO known recent exacerbation.     Some nausea, headache and neck pain when lump present.    These symptoms all resolved.     Problem 2:   She gets blisters in scalp.  They come and go and variable in location.  Alleviating factors: popping blister.     Problem 3:   Wondering about getting shingles vaccine and pneumonia vaccines.     Patient Active Problem List   Diagnosis     Genital herpes     Hypertension goal BP (blood pressure) < 140/90     HYPERLIPIDEMIA LDL GOAL <130      OBJECTIVE:Blood pressure 120/82, pulse 82, temperature 98.1  F (36.7  C), temperature source Tympanic, resp. rate 16, height 5' 5.5\" (1.664 m), weight 174 lb (78.9 kg), not currently breastfeeding. BMI=Body mass index is 28.51 kg/(m^2).  GENERAL APPEARANCE ADULT: Alert, no acute distress  ABDOMEN: soft, no organomegaly, masses or tenderness  SKIN: She has one small pustule in scalp.    Skin in crural fold left side without abnormality.  No palpable lumps today.      ASSESSMENT:   (R19.09) Groin lump  (primary encounter diagnosis)  Comment: This might have been and enlarged lymph node (gland).  They enlarge in response to inflammation or infection in their region.  Since it was big and disappeared it suggests nothing worrisome and no " treatment needed.  If you have a lump that persists or enlarges over time, have me check it out.    (L73.9) Folliculitis  Comment: Small pimples in hair follicles of scalp.  This is a skin infection.   Plan: doxycycline monohydrate 100 MG capsule        Take antibiotic 100mg twice daily for 10 days.   OK to pop the pimples when they show up.    Recheck if they get bigger, more tender.     Shingles vaccine is recommended for those over 60.  There is a new vaccine just out which may be more effective than the current one.  It would be OK to wait a couple months until we have more information on the new vaccine.

## 2017-11-15 NOTE — PATIENT INSTRUCTIONS
ASSESSMENT:   (R19.09) Groin lump  (primary encounter diagnosis)  Comment: This might have been and enlarged lymph node (gland).  They enlarge in response to inflammation or infection in their region.  Since it was big and disappeared it suggests nothing worrisome and no treatment needed.  If you have a lump that persists or enlarges over time, have me check it out.    (L73.9) Folliculitis  Comment: Small pimples in hair follicles of scalp.  This is a skin infection.   Plan: doxycycline monohydrate 100 MG capsule        Take antibiotic 100mg twice daily for 10 days.   OK to pop the pimples when they show up.    Recheck if they get bigger, more tender.     Shingles vaccine is recommended for those over 60.  There is a new vaccine just out which may be more effective than the current one.  It would be OK to wait a couple months until we have more information on the new vaccine.

## 2017-12-07 DIAGNOSIS — I10 ESSENTIAL HYPERTENSION WITH GOAL BLOOD PRESSURE LESS THAN 140/90: ICD-10-CM

## 2017-12-07 RX ORDER — HYDROCHLOROTHIAZIDE 25 MG/1
TABLET ORAL
Qty: 90 TABLET | Refills: 2 | Status: SHIPPED | OUTPATIENT
Start: 2017-12-07 | End: 2018-06-20

## 2017-12-19 DIAGNOSIS — A60.04 HERPES SIMPLEX VULVOVAGINITIS: ICD-10-CM

## 2017-12-19 RX ORDER — VALACYCLOVIR HYDROCHLORIDE 500 MG/1
500 TABLET, FILM COATED ORAL 2 TIMES DAILY
Qty: 6 TABLET | Refills: 3 | Status: SHIPPED | OUTPATIENT
Start: 2017-12-19 | End: 2019-07-22

## 2017-12-19 NOTE — TELEPHONE ENCOUNTER
Refill request received for Valacyclovir 500mg. Ramonita refill given, refer to PCP for further refills.     Camden Arenas - RN

## 2018-01-03 DIAGNOSIS — I10 ESSENTIAL HYPERTENSION WITH GOAL BLOOD PRESSURE LESS THAN 140/90: ICD-10-CM

## 2018-01-03 RX ORDER — AMLODIPINE BESYLATE 2.5 MG/1
TABLET ORAL
Qty: 90 TABLET | Refills: 1 | Status: SHIPPED | OUTPATIENT
Start: 2018-01-03 | End: 2018-06-20

## 2018-06-20 ENCOUNTER — OFFICE VISIT (OUTPATIENT)
Dept: FAMILY MEDICINE | Facility: CLINIC | Age: 73
End: 2018-06-20
Payer: COMMERCIAL

## 2018-06-20 VITALS
BODY MASS INDEX: 28.61 KG/M2 | TEMPERATURE: 97.8 F | WEIGHT: 178 LBS | RESPIRATION RATE: 16 BRPM | SYSTOLIC BLOOD PRESSURE: 136 MMHG | HEIGHT: 66 IN | DIASTOLIC BLOOD PRESSURE: 84 MMHG | HEART RATE: 82 BPM

## 2018-06-20 DIAGNOSIS — I10 ESSENTIAL HYPERTENSION WITH GOAL BLOOD PRESSURE LESS THAN 140/90: Primary | ICD-10-CM

## 2018-06-20 DIAGNOSIS — Z11.59 NEED FOR HEPATITIS C SCREENING TEST: ICD-10-CM

## 2018-06-20 DIAGNOSIS — M19.041 OSTEOARTHRITIS OF BOTH HANDS, UNSPECIFIED OSTEOARTHRITIS TYPE: ICD-10-CM

## 2018-06-20 DIAGNOSIS — R11.0 NAUSEA: ICD-10-CM

## 2018-06-20 DIAGNOSIS — E78.5 HYPERLIPIDEMIA LDL GOAL <130: ICD-10-CM

## 2018-06-20 DIAGNOSIS — M19.042 OSTEOARTHRITIS OF BOTH HANDS, UNSPECIFIED OSTEOARTHRITIS TYPE: ICD-10-CM

## 2018-06-20 DIAGNOSIS — Z12.11 SPECIAL SCREENING FOR MALIGNANT NEOPLASMS, COLON: ICD-10-CM

## 2018-06-20 LAB
ANION GAP SERPL CALCULATED.3IONS-SCNC: 3 MMOL/L (ref 3–14)
BUN SERPL-MCNC: 12 MG/DL (ref 7–30)
CALCIUM SERPL-MCNC: 9.4 MG/DL (ref 8.5–10.1)
CHLORIDE SERPL-SCNC: 100 MMOL/L (ref 94–109)
CHOLEST SERPL-MCNC: 169 MG/DL
CO2 SERPL-SCNC: 31 MMOL/L (ref 20–32)
CREAT SERPL-MCNC: 0.74 MG/DL (ref 0.52–1.04)
GFR SERPL CREATININE-BSD FRML MDRD: 77 ML/MIN/1.7M2
GLUCOSE SERPL-MCNC: 86 MG/DL (ref 70–99)
HDLC SERPL-MCNC: 55 MG/DL
LDLC SERPL CALC-MCNC: 86 MG/DL
NONHDLC SERPL-MCNC: 114 MG/DL
POTASSIUM SERPL-SCNC: 3.8 MMOL/L (ref 3.4–5.3)
SODIUM SERPL-SCNC: 134 MMOL/L (ref 133–144)
TRIGL SERPL-MCNC: 138 MG/DL

## 2018-06-20 PROCEDURE — 80048 BASIC METABOLIC PNL TOTAL CA: CPT | Performed by: FAMILY MEDICINE

## 2018-06-20 PROCEDURE — 36415 COLL VENOUS BLD VENIPUNCTURE: CPT | Performed by: FAMILY MEDICINE

## 2018-06-20 PROCEDURE — 99214 OFFICE O/P EST MOD 30 MIN: CPT | Performed by: FAMILY MEDICINE

## 2018-06-20 PROCEDURE — 80061 LIPID PANEL: CPT | Performed by: FAMILY MEDICINE

## 2018-06-20 RX ORDER — HYDROCHLOROTHIAZIDE 12.5 MG/1
12.5 TABLET ORAL DAILY
Qty: 90 TABLET | Refills: 3 | Status: SHIPPED | OUTPATIENT
Start: 2018-06-20 | End: 2019-07-22

## 2018-06-20 RX ORDER — AMLODIPINE BESYLATE 2.5 MG/1
1.25 TABLET ORAL DAILY
Qty: 45 TABLET | Refills: 3 | Status: SHIPPED | OUTPATIENT
Start: 2018-06-20 | End: 2019-07-22

## 2018-06-20 ASSESSMENT — PAIN SCALES - GENERAL: PAINLEVEL: NO PAIN (0)

## 2018-06-20 NOTE — MR AVS SNAPSHOT
After Visit Summary   6/20/2018    Sofia Freeman    MRN: [id number]           Patient Information     Date Of Birth          1945        Visit Information        Provider Department      6/20/2018 8:00 AM Danie Galvan MD [facility] [address]        Today's Diagnoses     Essential hypertension with goal blood pressure less than 140/90    -  1    Special screening for malignant neoplasms, colon        Hyperlipidemia LDL goal <130        Osteoarthritis of both hands, unspecified osteoarthritis type        Nausea        Need for hepatitis C screening test          Care Instructions    ASSESSMENT:   (I10) Essential hypertension with goal blood pressure less than 140/90  (primary encounter diagnosis)  Comment: doing well on decreased doses of medication.   Plan: amLODIPine (NORVASC) 2.5 MG tablet,         hydrochlorothiazide 12.5 MG TABS tablet, Basic         metabolic panel        No change in current treatment plan.  Refills.   Monitor BP periodically.  This can be done at home, in clinic, at our pharmacy, at a store or by neighbor or relative with a blood pressure cuff.  You should be sitting and relaxed for several minutes when taking blood pressure.   Goal BP (most readings) should be less than 140/90    Normal blood pressure is 120/80.    If your blood pressure is consistently at or above the goal, some changes should be made with lifestyle or medication to keep blood pressure under good control.    High blood pressure over time can lead to eye and kidney damage and increase risk for heart attacks and strokes.   Let us know if readings are often high, so we can help get your blood pressure under good control.     (Z12.11) Special screening for malignant neoplasms, colon  Comment:   Plan: Fecal colorectal cancer screen (FIT)        Complete FIT colon cancer screening test and send in the mail.      (E78.5) Hyperlipidemia LDL goal <130  Comment: has been good-due for  recheck  Plan: Lipid panel reflex to direct LDL Fasting        Fasting blood tests today.     (M19.041,  M19.042) Osteoarthritis of both hands, unspecified osteoarthritis type  Comment: arthritis at Base of both thumb CMC joints.  This is a common joint to have osteoarthritis.    Plan: Heat can help.   Acetaminophen (Tylenol) may be taken as needed for pain and fever.  The maximum doses are listed below, around 3000mg a day.  Regular strength pills are 325mg.  The maximum daily dose is two pills (650mg) every 4 to 6 hours up to 3250mg a day.   Extra strength pills are 500mg each.  The maximum dose is two pills (1000mg) every 6 hours as needed up to 3000mg a day.   Extended release pills are 650mg each.  The maximum dose is two pills (1300mg) every 8 hours as needed up to 3900mg a day.     Watch out for acetaminophen in other over the counter or prescription medications.      Too much acetaminophen can lead to serious liver damage.     (R11.0) Nausea  Comment: intermittent related to various foods.  There is no good blood test for pancreatic cancer.   Plan: You have identified foods that trigger the nausea.  If this gets worse over time, baca have more heartburn or stomach cramps, let me know, there are things we can try.     (Z11.59) Need for hepatitis C screening test  Comment:   Plan: **Hepatitis C Screen Reflex to RNA FUTURE         anytime          Follow-ups after your visit        Future tests that were ordered for you today     Open Future Orders        Priority Expected Expires Ordered    **Hepatitis C Screen Reflex to RNA FUTURE anytime Routine 6/20/2018 6/20/2019 6/20/2018    Fecal colorectal cancer screen (FIT) Routine 7/11/2018 9/12/2018 6/20/2018            Who to contact     If you have questions or need follow up information about today's clinic visit or your schedule please contact [facility] directly at [phone].  Normal or non-critical lab and imaging results will be  "communicated to you by MyChart, letter or phone within 4 business days after the clinic has received the results. If you do not hear from us within 7 days, please contact the clinic through MyChart or phone. If you have a critical or abnormal lab result, we will notify you by phone as soon as possible.  Submit refill requests through [employer] or call your pharmacy and they will forward the refill request to us. Please allow 3 business days for your refill to be completed.          Additional Information About Your Visit        Care EveryWhere ID     This is your Care EveryWhere ID. This could be used by other organizations to access your [facility] medical records  [id number]        Your Vitals Were     Pulse Temperature Respirations Height BMI (Body Mass Index)       82 97.8  F (36.6  C) (Tympanic) 16 5' 5.5\" (1.664 m) 29.17 kg/m2        Blood Pressure from Last 3 Encounters:   06/20/18 136/84   11/15/17 120/82   07/07/17 120/80    Weight from Last 3 Encounters:   06/20/18 178 lb (80.7 kg)   11/15/17 174 lb (78.9 kg)   07/07/17 172 lb (78 kg)              We Performed the Following     Basic metabolic panel     Lipid panel reflex to direct LDL Fasting          Today's Medication Changes          These changes are accurate as of 6/20/18  8:50 AM.  If you have any questions, ask your nurse or doctor.               These medicines have changed or have updated prescriptions.        Dose/Directions    amLODIPine 2.5 MG tablet   Commonly known as:  NORVASC   This may have changed:  See the new instructions.   Used for:  Essential hypertension with goal blood pressure less than 140/90   Changed by:  Danie Galvan MD        Dose:  1.25 mg   Take 0.5 tablets (1.25 mg) by mouth daily   Quantity:  45 tablet   Refills:  3       hydrochlorothiazide 12.5 MG Tabs tablet   This may have changed:    - medication strength  - See the new instructions.   Used for:  Essential hypertension with goal blood pressure less than 140/90 "   Changed by:  Danie Galvan MD        Dose:  12.5 mg   Take 1 tablet (12.5 mg) by mouth daily   Quantity:  90 tablet   Refills:  3            Where to get your medicines      These medications were sent to [id number] - [place], [place] - [address]  [address] [facility] [address]    Hours:  Closed 10-16-08 business to [facility] Phone:  [phone]     amLODIPine 2.5 MG tablet    hydrochlorothiazide 12.5 MG Tabs tablet                Primary Care Provider Office Phone # Fax #    Danie Galvan [phone] [address] [facility] [address]        Equal Access to Services     SHAINA WILSON : Jeniffer del castillo hadasho Sovirgen, waaxda luqadaha, qaybta kaalmada adeegyada, layla elaine . So [facility] [phone].    ATENCIÓN: Si habla español, tiene a shine disposición servicios gratuitos de asistencia lingüística. Llame al [phone].    We comply with applicable federal civil rights laws and [place] laws. We do not discriminate on the basis of race, color, national origin, age, disability, sex, sexual orientation, or gender identity.            Thank you!     Thank you for choosing [facility]  for your care. Our goal is always to provide you with excellent care. Hearing back from our patients is one way we can continue to improve our services. Please take a few minutes to complete the written survey that you may receive in the mail after your visit with us. Thank you!             Your Updated Medication List - Protect others around you: Learn how to safely use, store and throw away your medicines at www.disposemymeds.org.          This list is accurate as of 6/20/18  8:50 AM.  Always use your most recent med list.                   Brand Name Dispense Instructions for use Diagnosis    amLODIPine 2.5 MG tablet    NORVASC    45 tablet    Take 0.5 tablets (1.25 mg) by mouth daily    Essential hypertension with goal  blood pressure less than 140/90       aspirin 81 MG tablet     100 tablet    Take 1 tablet (81 mg) by mouth daily    Unspecified essential hypertension       atorvastatin 20 MG tablet    LIPITOR    45 tablet    TAKE ONE-HALF TABLET BY MOUTH EVERY DAY    Hyperlipidemia LDL goal <130       diclofenac 75 MG EC tablet    VOLTAREN    30 tablet    Take 1 tablet (75 mg) by mouth 2 times daily as needed for moderate pain        hydrochlorothiazide 12.5 MG Tabs tablet     90 tablet    Take 1 tablet (12.5 mg) by mouth daily    Essential hypertension with goal blood pressure less than 140/90       oxyCODONE-acetaminophen 5-325 MG per tablet    PERCOCET    10 tablet    Take 1-2 tablets by mouth every 6 hours as needed for moderate to severe pain        THERAPEUTIC MULTIVIT/MINERAL Tabs      1 TABLET BY MOUTH DAILY        valACYclovir 500 MG tablet    VALTREX    6 tablet    Take 1 tablet (500 mg) by mouth 2 times daily    Herpes simplex vulvovaginitis

## 2018-06-20 NOTE — PROGRESS NOTES
"  SUBJECTIVE:   Sofia Freeman is a 72 year old female who presents to clinic today for the following health issues:  Chief Complaint   Patient presents with     Hypertension     Hand Problem     Medication Reconciliation     Has been taking only 1/2 tabs of her Amlodipine and HCTZ due to nausea for the past 6 weeks.      Digestive issues.    She has cramping, nausea and diarrhea.   Diarrhea for two days. Bowels usually oK.    Nausea only certain foods. Yogurt, beets, fatty foods.   Gets cramps in stomach at night.  Intermittent.  Location: left upper abdomen.  Onset of symptoms: past year.  Triggering factors:?  Frequency: once a month.    Sister  from pancreatic cancer.    Has heartburn if overeats.  Uses Yadira Seymour which helps.     Hypertension Follow-up      Outpatient blood pressures are being checked at home.  Results are 110-130 Systolic   62-80\"s for the Diastolic .    Low Salt Diet: low salt  Her blood pressure has remained good on half doses of amlodipine and hydrochlorothiazide and has had no side effects.       Amount of exercise or physical activity: Busy with daily activities and walking dog    Problems taking medications regularly: No    Medication side effects: Nausea      Hands    Duration: year    Description (location/character/radiation):has noted lump on left hand.    Intensity:  Mild-Mod    Accompanying signs and symptoms: gets red on rt hand at times     History (similar episodes/previous evaluation): Yes    Precipitating or alleviating factors: None    Therapies tried and outcome: Tylenol helps  Pain thumb MCP joints bilateral.   Concerns the joint pains are related to west [place], Hep C or lyme.   Takes acetaminophen which helps.     Left sided chest pain      Duration: year    Description (location/character/radiation): has several areas on chest that are tender to the touch. Come and go     Intensity:  mild    Accompanying signs and symptoms: none    History (similar " "episodes/previous evaluation): None    Precipitating or alleviating factors: None    Therapies tried and outcome: Tylenol helps   Spots on rib cage are tender to touch.    Not related to activity or movements.     hyperlipidemia  TAking only 10mg daily.     Asking about shingles vaccine.    Would like Hep C test.      Patient Active Problem List   Diagnosis     Genital herpes     Hypertension goal BP (blood pressure) < 140/90     HYPERLIPIDEMIA LDL GOAL <130      ROS:General: POSITIVE for:, weight gain, 6# in the past year.  Some decreased energy.   Resp: No coughing, wheezing or shortness of breath  CV: No chest pains or palpitations  GI:see above   : No urinary frequency or dysuria, bladder or kidney problems  Musculoskeletal: POSITIVE  for:, pain in hands and chest wall.   Psychiatric: POSITIVE for:, stress,  is getting weaker all the time.  He is getting angrier.     OBJECTIVE:Blood pressure 136/84, pulse 82, temperature 97.8  F (36.6  C), temperature source Tympanic, resp. rate 16, height 5' 5.5\" (1.664 m), weight 178 lb (80.7 kg), not currently breastfeeding. BMI=Body mass index is 29.17 kg/(m^2).  GENERAL APPEARANCE ADULT: Alert, no acute distress  NECK: No adenopathy,masses or thyromegaly  RESP: lungs clear to auscultation   CV: normal rate, regular rhythm, no murmur or gallop  ABDOMEN: soft, no organomegaly, masses or tenderness  MS: extremities normal, no peripheral edema     ASSESSMENT:   (I10) Essential hypertension with goal blood pressure less than 140/90  (primary encounter diagnosis)  Comment: doing well on decreased doses of medication.   Plan: amLODIPine (NORVASC) 2.5 MG tablet,         hydrochlorothiazide 12.5 MG TABS tablet, Basic         metabolic panel        No change in current treatment plan.  Refills.   Monitor BP periodically.  This can be done at home, in clinic, at our pharmacy, at a store or by neighbor or relative with a blood pressure cuff.  You should be sitting and relaxed " for several minutes when taking blood pressure.   Goal BP (most readings) should be less than 140/90    Normal blood pressure is 120/80.    If your blood pressure is consistently at or above the goal, some changes should be made with lifestyle or medication to keep blood pressure under good control.    High blood pressure over time can lead to eye and kidney damage and increase risk for heart attacks and strokes.   Let us know if readings are often high, so we can help get your blood pressure under good control.     (Z12.11) Special screening for malignant neoplasms, colon  Comment:   Plan: Fecal colorectal cancer screen (FIT)        Complete FIT colon cancer screening test and send in the mail.      (E78.5) Hyperlipidemia LDL goal <130  Comment: has been good-due for recheck  Plan: Lipid panel reflex to direct LDL Fasting        Fasting blood tests today.     (M19.041,  M19.042) Osteoarthritis of both hands, unspecified osteoarthritis type  Comment: arthritis at Base of both thumb CMC joints.  This is a common joint to have osteoarthritis.    Plan: Heat can help.   Acetaminophen (Tylenol) may be taken as needed for pain and fever.  The maximum doses are listed below, around 3000mg a day.  Regular strength pills are 325mg.  The maximum daily dose is two pills (650mg) every 4 to 6 hours up to 3250mg a day.   Extra strength pills are 500mg each.  The maximum dose is two pills (1000mg) every 6 hours as needed up to 3000mg a day.   Extended release pills are 650mg each.  The maximum dose is two pills (1300mg) every 8 hours as needed up to 3900mg a day.     Watch out for acetaminophen in other over the counter or prescription medications.      Too much acetaminophen can lead to serious liver damage.     (R11.0) Nausea  Comment: intermittent related to various foods.  There is no good blood test for pancreatic cancer.   Plan: You have identified foods that trigger the nausea.  If this gets worse over time, baca have more  heartburn or stomach cramps, let me know, there are things we can try.     (Z11.59) Need for hepatitis C screening test  Comment:   Plan: Hepatitis C Screen Reflex to RNA FUTURE         anytime

## 2018-06-20 NOTE — NURSING NOTE
"Chief Complaint   Patient presents with     Hypertension       Initial /84  Pulse 82  Temp 97.8  F (36.6  C) (Tympanic)  Resp 16  Ht 5' 5.5\" (1.664 m)  Wt 178 lb (80.7 kg)  BMI 29.17 kg/m2 Estimated body mass index is 29.17 kg/(m^2) as calculated from the following:    Height as of this encounter: 5' 5.5\" (1.664 m).    Weight as of this encounter: 178 lb (80.7 kg).      Health Maintenance that is potentially due pending provider review:    FIT      Is there anyone who you would like to be able to receive your results?   If yes have patient fill out DONIS    "

## 2018-06-20 NOTE — PATIENT INSTRUCTIONS
ASSESSMENT:   (I10) Essential hypertension with goal blood pressure less than 140/90  (primary encounter diagnosis)  Comment: doing well on decreased doses of medication.   Plan: amLODIPine (NORVASC) 2.5 MG tablet,         hydrochlorothiazide 12.5 MG TABS tablet, Basic         metabolic panel        No change in current treatment plan.  Refills.   Monitor BP periodically.  This can be done at home, in clinic, at our pharmacy, at a store or by neighbor or relative with a blood pressure cuff.  You should be sitting and relaxed for several minutes when taking blood pressure.   Goal BP (most readings) should be less than 140/90    Normal blood pressure is 120/80.    If your blood pressure is consistently at or above the goal, some changes should be made with lifestyle or medication to keep blood pressure under good control.    High blood pressure over time can lead to eye and kidney damage and increase risk for heart attacks and strokes.   Let us know if readings are often high, so we can help get your blood pressure under good control.     (Z12.11) Special screening for malignant neoplasms, colon  Comment:   Plan: Fecal colorectal cancer screen (FIT)        Complete FIT colon cancer screening test and send in the mail.      (E78.5) Hyperlipidemia LDL goal <130  Comment: has been good-due for recheck  Plan: Lipid panel reflex to direct LDL Fasting        Fasting blood tests today.     (M19.041,  M19.042) Osteoarthritis of both hands, unspecified osteoarthritis type  Comment: arthritis at Base of both thumb CMC joints.  This is a common joint to have osteoarthritis.    Plan: Heat can help.   Acetaminophen (Tylenol) may be taken as needed for pain and fever.  The maximum doses are listed below, around 3000mg a day.  Regular strength pills are 325mg.  The maximum daily dose is two pills (650mg) every 4 to 6 hours up to 3250mg a day.   Extra strength pills are 500mg each.  The maximum dose is two pills (1000mg) every 6  hours as needed up to 3000mg a day.   Extended release pills are 650mg each.  The maximum dose is two pills (1300mg) every 8 hours as needed up to 3900mg a day.     Watch out for acetaminophen in other over the counter or prescription medications.      Too much acetaminophen can lead to serious liver damage.     (R11.0) Nausea  Comment: intermittent related to various foods.  There is no good blood test for pancreatic cancer.   Plan: You have identified foods that trigger the nausea.  If this gets worse over time, baca have more heartburn or stomach cramps, let me know, there are things we can try.     (Z11.59) Need for hepatitis C screening test  Comment:   Plan: **Hepatitis C Screen Reflex to RNA FUTURE         anytime

## 2018-06-20 NOTE — PROGRESS NOTES
"Please call.  The blood chemistries (Basic metabolic panel) are all normal including electrolytes (salt balances in the blood), blood glucose and kidney tests.   Lipid tests including total cholesterol, triglycerides, HDL (\"good cholesterol\") and LDL (\"bad cholesterol\") are normal.    Her tests look good.  PLAN: No new changes in treatment recommended."

## 2018-09-18 PROCEDURE — G0328 FECAL BLOOD SCRN IMMUNOASSAY: HCPCS | Performed by: FAMILY MEDICINE

## 2018-09-21 DIAGNOSIS — Z12.11 SPECIAL SCREENING FOR MALIGNANT NEOPLASMS, COLON: ICD-10-CM

## 2018-09-21 NOTE — LETTER
September 24, 2018      Sofia Freeman  [address] [address] [facility] [id number]        Dear ,    We are writing to inform you of your test results.    Your test results fall within the expected range(s) or remain unchanged from previous results.  Please continue with current treatment plan.Please repeat in 1 year.     Resulted Orders   Fecal colorectal cancer screen (FIT)   Result Value Ref Range    Occult Blood Scn FIT Negative NEG^Negative       If you have any questions or concerns, please call the clinic at the number listed above.       Sincerely,        Danie Galvan MD

## 2018-09-22 LAB — HEMOCCULT STL QL IA: NEGATIVE

## 2018-09-23 NOTE — PROGRESS NOTES
Please call.  The colon cancer screening test (FIT test) is negative for sign of colon cancer.    PLAN: Repeat once every year.

## 2019-01-22 DIAGNOSIS — E78.5 HYPERLIPIDEMIA LDL GOAL <130: ICD-10-CM

## 2019-01-22 RX ORDER — ATORVASTATIN CALCIUM 20 MG/1
TABLET, FILM COATED ORAL
Qty: 45 TABLET | Refills: 1 | Status: SHIPPED | OUTPATIENT
Start: 2019-01-22 | End: 2019-07-22

## 2019-01-22 NOTE — TELEPHONE ENCOUNTER
"Requested Prescriptions   Pending Prescriptions Disp Refills     atorvastatin (LIPITOR) 20 MG tablet [Pharmacy Med Name: ATORVASTATIN 20 MG  TAB APOT]  Last Written Prescription Date:  10/31/17  Last Fill Quantity: 45,  # refills: 3   Last office visit: 6/20/2018 with prescribing provider:  Leaf   Future Office Visit:     45 tablet 2     Sig: TAKE ONE-HALF TABLET BY MOUTH EVERY DAY    Statins Protocol Passed - 1/22/2019 10:31 AM       Passed - LDL on file in past 12 months    Recent Labs   Lab Test 06/20/18  0848   LDL 86            Passed - No abnormal creatine kinase in past 12 months    Recent Labs   Lab Test 07/02/17  1544   CKT 98               Passed - Recent (12 mo) or future (30 days) visit within the authorizing provider's specialty    Patient had office visit in the last 12 months or has a visit in the next 30 days with authorizing provider or within the authorizing provider's specialty.  See \"Patient Info\" tab in inbasket, or \"Choose Columns\" in Meds & Orders section of the refill encounter.             Passed - Medication is active on med list       Passed - Patient is age 18 or older       Passed - No active pregnancy on record       Passed - No positive pregnancy test in past 12 months          "

## 2019-07-22 ENCOUNTER — OFFICE VISIT (OUTPATIENT)
Dept: FAMILY MEDICINE | Facility: CLINIC | Age: 74
End: 2019-07-22
Payer: COMMERCIAL

## 2019-07-22 VITALS
SYSTOLIC BLOOD PRESSURE: 108 MMHG | HEART RATE: 74 BPM | BODY MASS INDEX: 28.12 KG/M2 | WEIGHT: 175 LBS | DIASTOLIC BLOOD PRESSURE: 82 MMHG | OXYGEN SATURATION: 96 % | HEIGHT: 66 IN | TEMPERATURE: 97.4 F

## 2019-07-22 DIAGNOSIS — Z12.11 SPECIAL SCREENING FOR MALIGNANT NEOPLASMS, COLON: ICD-10-CM

## 2019-07-22 DIAGNOSIS — Z78.0 ASYMPTOMATIC POSTMENOPAUSAL STATUS: ICD-10-CM

## 2019-07-22 DIAGNOSIS — L73.9 FOLLICULITIS: ICD-10-CM

## 2019-07-22 DIAGNOSIS — E78.5 HYPERLIPIDEMIA LDL GOAL <130: ICD-10-CM

## 2019-07-22 DIAGNOSIS — Z00.00 MEDICARE ANNUAL WELLNESS VISIT, SUBSEQUENT: Primary | ICD-10-CM

## 2019-07-22 DIAGNOSIS — R07.89 CHEST WALL PAIN: ICD-10-CM

## 2019-07-22 DIAGNOSIS — I10 ESSENTIAL HYPERTENSION WITH GOAL BLOOD PRESSURE LESS THAN 140/90: ICD-10-CM

## 2019-07-22 DIAGNOSIS — A60.04 HERPES SIMPLEX VULVOVAGINITIS: ICD-10-CM

## 2019-07-22 LAB
ANION GAP SERPL CALCULATED.3IONS-SCNC: 5 MMOL/L (ref 3–14)
BUN SERPL-MCNC: 15 MG/DL (ref 7–30)
CALCIUM SERPL-MCNC: 9.5 MG/DL (ref 8.5–10.1)
CHLORIDE SERPL-SCNC: 102 MMOL/L (ref 94–109)
CHOLEST SERPL-MCNC: 204 MG/DL
CO2 SERPL-SCNC: 32 MMOL/L (ref 20–32)
CREAT SERPL-MCNC: 0.78 MG/DL (ref 0.52–1.04)
GFR SERPL CREATININE-BSD FRML MDRD: 74 ML/MIN/{1.73_M2}
GLUCOSE SERPL-MCNC: 82 MG/DL (ref 70–99)
HDLC SERPL-MCNC: 71 MG/DL
LDLC SERPL CALC-MCNC: 102 MG/DL
NONHDLC SERPL-MCNC: 133 MG/DL
POTASSIUM SERPL-SCNC: 4.2 MMOL/L (ref 3.4–5.3)
SODIUM SERPL-SCNC: 139 MMOL/L (ref 133–144)
TRIGL SERPL-MCNC: 153 MG/DL

## 2019-07-22 PROCEDURE — 80048 BASIC METABOLIC PNL TOTAL CA: CPT | Performed by: FAMILY MEDICINE

## 2019-07-22 PROCEDURE — 80061 LIPID PANEL: CPT | Performed by: FAMILY MEDICINE

## 2019-07-22 PROCEDURE — 99213 OFFICE O/P EST LOW 20 MIN: CPT | Mod: 25 | Performed by: FAMILY MEDICINE

## 2019-07-22 PROCEDURE — 36415 COLL VENOUS BLD VENIPUNCTURE: CPT | Performed by: FAMILY MEDICINE

## 2019-07-22 PROCEDURE — G0438 PPPS, INITIAL VISIT: HCPCS | Performed by: FAMILY MEDICINE

## 2019-07-22 RX ORDER — HYDROCHLOROTHIAZIDE 12.5 MG/1
12.5 TABLET ORAL DAILY
Qty: 90 TABLET | Refills: 3 | Status: SHIPPED | OUTPATIENT
Start: 2019-07-22 | End: 2020-11-09

## 2019-07-22 RX ORDER — AMLODIPINE BESYLATE 2.5 MG/1
1.25 TABLET ORAL DAILY
Qty: 45 TABLET | Refills: 3 | Status: SHIPPED | OUTPATIENT
Start: 2019-07-22 | End: 2020-09-22

## 2019-07-22 RX ORDER — ATORVASTATIN CALCIUM 10 MG/1
10 TABLET, FILM COATED ORAL DAILY
Qty: 90 TABLET | Refills: 3 | Status: SHIPPED | OUTPATIENT
Start: 2019-07-22 | End: 2021-01-18

## 2019-07-22 RX ORDER — VALACYCLOVIR HYDROCHLORIDE 500 MG/1
500 TABLET, FILM COATED ORAL 2 TIMES DAILY
Qty: 6 TABLET | Refills: 3 | Status: SHIPPED | OUTPATIENT
Start: 2019-07-22 | End: 2021-01-18

## 2019-07-22 ASSESSMENT — PAIN SCALES - GENERAL: PAINLEVEL: NO PAIN (0)

## 2019-07-22 ASSESSMENT — MIFFLIN-ST. JEOR: SCORE: 1305.54

## 2019-07-22 NOTE — LETTER
July 25, 2019      Sofia Freeman  [address] [facility] [id number]        Dear ,    We are writing to inform you of your test results.    Your triglycerides are slightly high as is LDL cholesterol.     New cholesterol guidelines (from AHA/ACC pooled risk calculation) estimates 10 year risk of having a heart attack at about 15.5%.  Any risk over 7.5% is considered significant and statin type medication is recommended to prevent heart attacks.      Continue to take atorvastatin 10mg and recheck labs in 6 months      If you have any questions or concerns, please call the clinic at the number listed above.       Sincerely,        Danie Galvan MD/[place]

## 2019-07-22 NOTE — PROGRESS NOTES
SUBJECTIVE:   Sofia Freeman is a 73 year old female who presents for Preventive Visit.  Chief Complaint   Patient presents with     Wellness Visit      Pain in L side ribcage - spoke about at last visit.  Various spots that are painful.  comes and goes.  Not sure if related to activity.   Seems like small lumps that come and go.     Blisters on scalp - spoke about at last visit.  Gets little blisters.  Tried antibiotic which did not help.  Has tried various shampoos and soaps.  They are sore.  Usually has a half dozen.  Various spots.  Painful.  She pops them-watery drainage.     Bowel Movement  - feels prolapse or hemmorroid  - is this normal.  She does a lot of heavy lifting, hay cinthia.   No constipation.  Stools soft. After bowel movement feels like tissues comes out and goes back up after bowel movement.  No pain.  No bleeding.     Checking blood pressure at home.  121-148/75-95.  1/7 readings high in the past 2 months.     Are you in the first 12 months of your Medicare coverage?  No  Do you feel safe in your environment? Yes    Do you have a Health Care Directive? Yes: Patient states has Advance Directive and will bring in a copy to clinic.    Fall risk  Fallen 2 or more times in the past year?: No  Any fall with injury in the past year?: No    Cognitive Screening   1) Repeat 3 items (Leader, Season, Table)    2) Clock draw: NORMAL  3) 3 item recall: Recalls 3 objects  Results: 3 items recalled: COGNITIVE IMPAIRMENT LESS LIKELY    Mini-CogTM Copyright DENIA Holguin. Licensed by the author for use in [facility]; reprinted with permission (susan@.[email or web address]). All rights reserved.      Do you have sleep apnea, excessive snoring or daytime drowsiness?: no    Social History     Tobacco Use     Smoking status: Never Smoker     Smokeless tobacco: Never Used   Substance Use Topics     Alcohol use: No     Alcohol/week: 0.0 oz     If you drink alcohol do you typically have >3 drinks per day or >7 drinks per  week? Not applicable    Alcohol Use 7/31/2015   Prescreen: >3 drinks/day or >7 drinks/week? The patient does not drink >3 drinks per day nor >7 drinks per week.     Current providers sharing in care for this patient include:   Patient Care Team:  Danie Galvan MD as PCP - General  Danie Galvan MD as Assigned PCP   No specialists.    The following health maintenance items are reviewed in Epic and correct as of today:  Health Maintenance   Topic Date Due     DEXA  1945     ZOSTER IMMUNIZATION (1 of 2) 07/28/1993     MEDICARE ANNUAL WELLNESS VISIT  07/31/2016     FALL RISK ASSESSMENT  10/06/2017     ADVANCE CARE PLANNING  05/17/2018     PHQ-2  01/01/2019     INFLUENZA VACCINE (1) 09/01/2019     FIT  09/18/2019     MAMMO SCREENING  10/30/2019     DTAP/TDAP/TD IMMUNIZATION (4 - Td) 01/07/2023     LIPID  06/20/2023     MIGRAINE ACTION PLAN  Completed     PNEUMOCOCCAL IMMUNIZATION 65+ LOW/MEDIUM RISK  Completed     IPV IMMUNIZATION  Aged Out     MENINGITIS IMMUNIZATION  Aged Out     Patient Active Problem List    Diagnosis Date Noted     HYPERLIPIDEMIA LDL GOAL <130 10/31/2010     Priority: Medium     Hypertension goal BP (blood pressure) < 140/90 06/19/2006     Priority: Medium     Genital herpes      Priority: Medium       History   Smoking Status     Never Smoker   Smokeless Tobacco     Never Used     Family history:  CV disease: possibly father.   Breast cancer: no  Colon cancer: no  Ovarian/uterine/cervical cancer: no    Multivitamin or Vit D use: MVI sometimes.     Vaccines:current    Lab Results   Component Value Date    PAP NIL 11/30/2009     Past colon cancer screen:2005 colonoscopy.  FIT September 2018  Past mammogram:October 2017    ROS:General: POSITIVE for:, gradual decrease in energy over time.   Eyes: Negative for vision changes or eye problems.  Sclera getting yellow?.  ENT: No problems with ears, nose or throat.  No difficulty swallowing.  Resp: No coughing, wheezing or shortness of  "breath  CV: No chest pains or palpitations  GI: No nausea, vomiting,  heartburn, abdominal pain, diarrhea, constipation or change in bowel habits  : No urinary frequency or dysuria, bladder or kidney problems  Musculoskeletal: POSITIVE  for:, sore spots in chest wall.   Neurologic: No headaches, numbness, tingling, weakness, problems with balance or coordination  Psychiatric: No problems with anxiety, depression or mental health  Heme/immune/allergy: POSITIVE  for:, anemia as a child.   Endocrine: No history of thyroid disease, diabetes or other endocrine disorders  Skin: see above     OBJECTIVE:                                                    OBJECTIVE:Blood pressure 108/82, pulse 74, temperature 97.4  F (36.3  C), temperature source Tympanic, height 1.676 m (5' 6\"), weight 79.4 kg (175 lb), SpO2 96 %, not currently breastfeeding. BMI=Body mass index is 28.25 kg/m .  GENERAL APPEARANCE ADULT: Alert, no acute distress  EYES: PERRL, EOM normal, conjunctiva and lids normal  HENT: Ears and TMs normal, oral mucosa and posterior oropharynx normal  NECK: No adenopathy,masses or thyromegaly  RESP: lungs clear to auscultation   BREAST: normal to inspection and palpation  CV: normal rate, regular rhythm, no murmur or gallop  ABDOMEN: soft, no organomegaly, masses or tenderness  MS: extremities normal, no peripheral edema  A couple tender spots on left anterior chest wall at third costrosternal jct and under left breast.   SKIN: small pustule on scalp and a couple old healing red spots.  Looks like folliculitis.       ASSESSMENT/PLAN:                                                    Lifestyle recommendations:continue current healthy lifestyle efforts including regular exercise and keeping a good weight  The following exams/tests were recommended and discussed for health maintenance:  FIT testing to check for hidden blood in the stool is a colon cancer screening test which should be done once yearly if normal.  If " abnormal, a colonoscopy is recommended.    Mammogram screening recommendations differ among expert groups.  They may be started at age 40 or 50.  Screening can be yearly or every other year depending upon a person's risk and preference.     (Z00.00) Medicare annual wellness visit, subsequent  (primary encounter diagnosis)    (I10) Essential hypertension with goal blood pressure less than 140/90  Comment: doing well   Plan: amLODIPine (NORVASC) 2.5 MG tablet,         hydrochlorothiazide (HYDRODIURIL) 12.5 MG         tablet        No change in current treatment plan.  Refills.   Monitor BP periodically.  This can be done at home, in clinic, at our pharmacy, at a store or by neighbor or relative with a blood pressure cuff.  You should be sitting and relaxed for several minutes when taking blood pressure.   Goal BP (most readings) should be less than 140/90    Normal blood pressure is 120/80.    If your blood pressure is consistently at or above the goal, some changes should be made with lifestyle or medication to keep blood pressure under good control.    High blood pressure over time can lead to eye and kidney damage and increase risk for heart attacks and strokes.   Let us know if readings are often high, so we can help get your blood pressure under good control.     (E78.5) Hyperlipidemia LDL goal <130  Comment: due for recheck  Plan: Lipid panel reflex to direct LDL Fasting,         atorvastatin (LIPITOR) 10 MG tablet        Refills.  Fasting blood tests today.     (L73.9) Folliculitis  Comment: spots on scalp are inflamed hair follicles.    Plan: Various shampoos and oral antibiotic has not helped.   We could do Dermatology referral if desired or if t hey get worse.     (R07.89) Chest wall pain  Comment: I think this is in chest wall muscles, no internal organs like heart or lungs.   Plan: No treatment needed.  Let me know if this changes.     (Z78.0) Asymptomatic postmenopausal status  Comment:   Plan: DX  "Hip/Pelvis/Spine        Schedule an appointment with diagnostics for bone scan.   Call [phone] to schedule.      (Z12.11) Special screening for malignant neoplasms, colon  Comment:   Plan: Fecal colorectal cancer screen (FIT)        Complete FIT colon cancer screening test and send in the mail.     (A60.04) Herpes simplex vulvovaginitis  Comment:   Plan: valACYclovir (VALTREX) 500 MG tablet        Refills as needed.     I think you have some internal hemorrhoids.  If the tissue stays out longer, becomes painful or bleeds, we could have you see the surgeon.        End of Life Planning:  Patient currently has an advanced directive: No.  I have verified the patient's ablity to prepare an advanced directive/make health care decisions.  Literature was provided to assist patient in preparing an advanced directive.    COUNSELING:  Reviewed preventive health counseling, as reflected in patient instructions  Special attention given to:       Colon cancer screening    Estimated body mass index is 28.25 kg/m  as calculated from the following:    Height as of this encounter: 1.676 m (5' 6\").    Weight as of this encounter: 79.4 kg (175 lb).         reports that she has never smoked. She has never used smokeless tobacco.      Appropriate preventive services were discussed with this patient, including applicable screening as appropriate for cardiovascular disease, diabetes, osteopenia/osteoporosis, and glaucoma.  As appropriate for age/gender, discussed screening for colorectal cancer, prostate cancer, breast cancer, and cervical cancer. Checklist reviewing preventive services available has been given to the patient.    Reviewed patients plan of care and provided an AVS. The Basic Care Plan (routine screening as documented in Health Maintenance) for Sofia meets the Care Plan requirement. This Care Plan has been established and reviewed with the Patient.    Counseling Resources:  ATP IV Guidelines  Pooled Cohorts Equation " Calculator  Breast Cancer Risk Calculator  FRAX Risk Assessment  ICSI Preventive Guidelines  Dietary Guidelines for Americans, 2010  Medication Review's MyPlate  ASA Prophylaxis  Lung CA Screening    Danie Galvan MD  [facility]    Identified Health Risks:

## 2019-07-22 NOTE — PATIENT INSTRUCTIONS
ASSESSMENT/PLAN:                                                    Lifestyle recommendations:continue current healthy lifestyle efforts including regular exercise and keeping a good weight  The following exams/tests were recommended and discussed for health maintenance:  FIT testing to check for hidden blood in the stool is a colon cancer screening test which should be done once yearly if normal.  If abnormal, a colonoscopy is recommended.    Mammogram screening recommendations differ among expert groups.  They may be started at age 40 or 50.  Screening can be yearly or every other year depending upon a person's risk and preference.     (Z00.00) Medicare annual wellness visit, subsequent  (primary encounter diagnosis)    (I10) Essential hypertension with goal blood pressure less than 140/90  Comment: doing well   Plan: amLODIPine (NORVASC) 2.5 MG tablet,         hydrochlorothiazide (HYDRODIURIL) 12.5 MG         tablet        No change in current treatment plan.  Refills.   Monitor BP periodically.  This can be done at home, in clinic, at our pharmacy, at a store or by neighbor or relative with a blood pressure cuff.  You should be sitting and relaxed for several minutes when taking blood pressure.   Goal BP (most readings) should be less than 140/90    Normal blood pressure is 120/80.    If your blood pressure is consistently at or above the goal, some changes should be made with lifestyle or medication to keep blood pressure under good control.    High blood pressure over time can lead to eye and kidney damage and increase risk for heart attacks and strokes.   Let us know if readings are often high, so we can help get your blood pressure under good control.     (E78.5) Hyperlipidemia LDL goal <130  Comment: due for recheck  Plan: Lipid panel reflex to direct LDL Fasting,         atorvastatin (LIPITOR) 10 MG tablet        Refills.  Fasting blood tests today.     (L73.9) Folliculitis  Comment: spots on scalp are  inflamed hair follicles.    Plan: Various shampoos and oral antibiotic has not helped.   We could do Dermatology referral if desired or if t hey get worse.     (R07.89) Chest wall pain  Comment: I think this is in chest wall muscles, no internal organs like heart or lungs.   Plan: No treatment needed.  Let me know if this changes.     (Z78.0) Asymptomatic postmenopausal status  Comment:   Plan: DX Hip/Pelvis/Spine        Schedule an appointment with diagnostics for bone scan.   Call [phone] to schedule.      (Z12.11) Special screening for malignant neoplasms, colon  Comment:   Plan: Fecal colorectal cancer screen (FIT)        Complete FIT colon cancer screening test and send in the mail.     (A60.04) Herpes simplex vulvovaginitis  Comment:   Plan: valACYclovir (VALTREX) 500 MG tablet        Refills as needed.     I think you have some internal hemorrhoids.  If the tissue stays out longer, becomes painful or bleeds, we could have you see the surgeon.

## 2019-07-23 NOTE — RESULT ENCOUNTER NOTE
Please call.  triglycerides slightly high as is LDL cholesterol.   New cholesterol guidelines (from AHA/ACC pooled risk calculation) estimates 10 year risk of having a heart attack at about 15.5%.  Any risk over 7.5% is considered significant and statin type medication is recommended to prevent heart attacks.    PLAN: We could add atorvastatin 10mg daily in a small dose to improve cholesterol and lower risk for heart attacks and strokes.    If willing, Rx #30 with 11 refills.  Recheck fasting lipids and ALT in 2 months.   Please arrange for prescriptions and orders.     The 10-year ASCVD risk score (Mariettakyung HESS Jr., et al., 2013) is: 15.5%    Values used to calculate the score:      Age: 73 years      Sex: Female      Is Non- : No      Diabetic: No      Tobacco smoker: No      Systolic Blood Pressure: 108 mmHg      Is BP treated: Yes      HDL Cholesterol: 71 mg/dL      Total Cholesterol: 204 mg/dL

## 2019-07-25 DIAGNOSIS — E78.5 HYPERLIPIDEMIA LDL GOAL <130: Primary | ICD-10-CM

## 2019-07-30 PROCEDURE — 82274 ASSAY TEST FOR BLOOD FECAL: CPT | Performed by: FAMILY MEDICINE

## 2019-08-01 DIAGNOSIS — Z12.11 SPECIAL SCREENING FOR MALIGNANT NEOPLASMS, COLON: ICD-10-CM

## 2019-08-01 LAB — HEMOCCULT STL QL IA: NEGATIVE

## 2019-08-01 NOTE — LETTER
August 2, 2019      Sofia Freeman  [address] [address] [facility] [id number]        Dear ,    We are writing to inform you of your test results.    Your FIT test for colon cancer screening was normal. Please repeat in one year.       Resulted Orders   Fecal colorectal cancer screen (FIT)   Result Value Ref Range    Occult Blood Scn FIT Negative NEG^Negative       If you have any questions or concerns, please call the clinic at the number listed above.       Sincerely,        Danie Galvan MD/milana

## 2019-08-15 ENCOUNTER — HOSPITAL ENCOUNTER (OUTPATIENT)
Dept: BONE DENSITY | Facility: CLINIC | Age: 74
Discharge: HOME | End: 2019-08-15
Attending: FAMILY MEDICINE | Admitting: FAMILY MEDICINE
Payer: COMMERCIAL

## 2019-08-15 DIAGNOSIS — Z78.0 ASYMPTOMATIC POSTMENOPAUSAL STATUS: ICD-10-CM

## 2019-08-15 PROCEDURE — 77080 DXA BONE DENSITY AXIAL: CPT

## 2019-08-20 NOTE — RESULT ENCOUNTER NOTE
"Hi Sofia,   You have \"osteopenia\" which is some ,dec bone density but not to the point of osteoporosis.   PLAN: Lifestyle recommendations:regular exercise, at least 150 minutes per week (average 30 minutes 5 times a week)  dietary calcium should be 1200 mg a day.  Dairy products work the best but calcium supplements can also be used  vitamin D at least 800 IU daily.     TERESA GANDHI MD"

## 2019-08-21 ENCOUNTER — ANCILLARY PROCEDURE (OUTPATIENT)
Dept: MAMMOGRAPHY | Facility: CLINIC | Age: 74
End: 2019-08-21
Payer: COMMERCIAL

## 2019-08-21 DIAGNOSIS — Z12.31 VISIT FOR SCREENING MAMMOGRAM: ICD-10-CM

## 2019-08-21 PROCEDURE — 77063 BREAST TOMOSYNTHESIS BI: CPT | Mod: TC

## 2019-08-21 PROCEDURE — 77067 SCR MAMMO BI INCL CAD: CPT | Mod: TC

## 2019-10-08 ENCOUNTER — OFFICE VISIT (OUTPATIENT)
Dept: FAMILY MEDICINE | Facility: CLINIC | Age: 74
End: 2019-10-08
Payer: COMMERCIAL

## 2019-10-08 VITALS
BODY MASS INDEX: 27.97 KG/M2 | OXYGEN SATURATION: 95 % | RESPIRATION RATE: 18 BRPM | WEIGHT: 174 LBS | HEIGHT: 66 IN | TEMPERATURE: 98 F

## 2019-10-08 DIAGNOSIS — R42 DIZZINESS: Primary | ICD-10-CM

## 2019-10-08 DIAGNOSIS — R68.89 OTHER GENERAL SYMPTOMS AND SIGNS: ICD-10-CM

## 2019-10-08 LAB
ALBUMIN SERPL-MCNC: 4 G/DL (ref 3.4–5)
ALP SERPL-CCNC: 70 U/L (ref 40–150)
ALT SERPL W P-5'-P-CCNC: 31 U/L (ref 0–50)
ANION GAP SERPL CALCULATED.3IONS-SCNC: 4 MMOL/L (ref 3–14)
AST SERPL W P-5'-P-CCNC: 24 U/L (ref 0–45)
BASOPHILS # BLD AUTO: 0.1 10E9/L (ref 0–0.2)
BASOPHILS NFR BLD AUTO: 0.4 %
BILIRUB SERPL-MCNC: 1.3 MG/DL (ref 0.2–1.3)
BUN SERPL-MCNC: 12 MG/DL (ref 7–30)
CALCIUM SERPL-MCNC: 9.6 MG/DL (ref 8.5–10.1)
CHLORIDE SERPL-SCNC: 101 MMOL/L (ref 94–109)
CO2 SERPL-SCNC: 31 MMOL/L (ref 20–32)
CREAT SERPL-MCNC: 0.71 MG/DL (ref 0.52–1.04)
DIFFERENTIAL METHOD BLD: ABNORMAL
EOSINOPHIL # BLD AUTO: 0.1 10E9/L (ref 0–0.7)
EOSINOPHIL NFR BLD AUTO: 0.9 %
ERYTHROCYTE [DISTWIDTH] IN BLOOD BY AUTOMATED COUNT: 13.5 % (ref 10–15)
GFR SERPL CREATININE-BSD FRML MDRD: 83 ML/MIN/{1.73_M2}
GLUCOSE SERPL-MCNC: 87 MG/DL (ref 70–99)
HCT VFR BLD AUTO: 41.8 % (ref 35–47)
HGB BLD-MCNC: 13.8 G/DL (ref 11.7–15.7)
LYMPHOCYTES # BLD AUTO: 2.6 10E9/L (ref 0.8–5.3)
LYMPHOCYTES NFR BLD AUTO: 20.6 %
MCH RBC QN AUTO: 30.2 PG (ref 26.5–33)
MCHC RBC AUTO-ENTMCNC: 33 G/DL (ref 31.5–36.5)
MCV RBC AUTO: 92 FL (ref 78–100)
MONOCYTES # BLD AUTO: 0.9 10E9/L (ref 0–1.3)
MONOCYTES NFR BLD AUTO: 7.3 %
NEUTROPHILS # BLD AUTO: 9 10E9/L (ref 1.6–8.3)
NEUTROPHILS NFR BLD AUTO: 70.8 %
PLATELET # BLD AUTO: 297 10E9/L (ref 150–450)
POTASSIUM SERPL-SCNC: 3.4 MMOL/L (ref 3.4–5.3)
PROT SERPL-MCNC: 7.7 G/DL (ref 6.8–8.8)
RBC # BLD AUTO: 4.57 10E12/L (ref 3.8–5.2)
SODIUM SERPL-SCNC: 136 MMOL/L (ref 133–144)
TSH SERPL DL<=0.005 MIU/L-ACNC: 1.55 MU/L (ref 0.4–4)
WBC # BLD AUTO: 12.7 10E9/L (ref 4–11)

## 2019-10-08 PROCEDURE — 99214 OFFICE O/P EST MOD 30 MIN: CPT | Performed by: PHYSICIAN ASSISTANT

## 2019-10-08 PROCEDURE — 36415 COLL VENOUS BLD VENIPUNCTURE: CPT | Performed by: PHYSICIAN ASSISTANT

## 2019-10-08 PROCEDURE — 85025 COMPLETE CBC W/AUTO DIFF WBC: CPT | Performed by: PHYSICIAN ASSISTANT

## 2019-10-08 PROCEDURE — 84443 ASSAY THYROID STIM HORMONE: CPT | Performed by: PHYSICIAN ASSISTANT

## 2019-10-08 PROCEDURE — 80053 COMPREHEN METABOLIC PANEL: CPT | Performed by: PHYSICIAN ASSISTANT

## 2019-10-08 PROCEDURE — 93000 ELECTROCARDIOGRAM COMPLETE: CPT | Performed by: PHYSICIAN ASSISTANT

## 2019-10-08 ASSESSMENT — ENCOUNTER SYMPTOMS
PALPITATIONS: 0
VOMITING: 0
EYE PAIN: 0
NAUSEA: 0
CHEST TIGHTNESS: 0
DIARRHEA: 0
CHILLS: 0
EYE REDNESS: 0
RHINORRHEA: 0
ABDOMINAL PAIN: 0
TROUBLE SWALLOWING: 0
ARTHRALGIAS: 0
COUGH: 0
SORE THROAT: 0
SINUS PRESSURE: 0
WHEEZING: 0
SHORTNESS OF BREATH: 0
MYALGIAS: 0
UNEXPECTED WEIGHT CHANGE: 0
FEVER: 0
BACK PAIN: 0
FATIGUE: 0

## 2019-10-08 ASSESSMENT — MIFFLIN-ST. JEOR: SCORE: 1296.01

## 2019-10-08 ASSESSMENT — PAIN SCALES - GENERAL: PAINLEVEL: NO PAIN (0)

## 2019-10-08 NOTE — PROGRESS NOTES
SUBJECTIVE:   Sofia Freeman is a 74 year old female presenting with a chief complaint of   Chief Complaint   Patient presents with     Dizziness     Patient has had 2 episodes of dizziness but not falling. Patents first episode was 3 weeks ago and woke up at midnight with nausea and woke up and was making breakfast and she had a feeling of falling. Patient woke up at 3 Am and was nausea and started breakfast and she was standing by microwave and it was moving.  Patient reports it lasting for a quick sudden moment and then will go away. I average patients 13 home blood pressures and they are running 146/94.       She is an established patient of [facility].      Patient reports 2 episodes of dizziness in the past 2 weeks. The 2 episodes only lasted a few seconds. She describes the feeling as being lightheaded. She was standing today when the episode happened. She does not feel a rotational component. No dizziness related to head position. She wonders if it related to not drinking enough fluids. Sometimes she forgets to drink enough. 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 No chest pain, shortness of breath, palpitations, weakness or numbness/tingling     Review of Systems   Constitutional: Negative for chills, fatigue, fever and unexpected weight change.   HENT: Negative for congestion, ear pain, postnasal drip, rhinorrhea, sinus pressure, sore throat and trouble swallowing.    Eyes: Negative for pain, redness and visual disturbance.   Respiratory: Negative for cough, chest tightness, shortness of breath and wheezing.    Cardiovascular: Negative for chest pain and palpitations.   Gastrointestinal: Negative for abdominal pain, diarrhea, nausea and vomiting.   Endocrine: Negative.    Genitourinary: Negative.    Musculoskeletal: Negative for arthralgias, back pain and myalgias.   Skin: Negative for rash.   Neurological: Positive for dizziness. Negative for tremors, seizures, syncope,  "facial asymmetry, speech difficulty, weakness, light-headedness, numbness and headaches.       Past Medical History:   Diagnosis Date     Cataract 2011     MIGRAINE NOS W/O MENTN INTRACTABLE     resolved with menopause     Family History   Problem Relation Age of Onset     Hypertension Mother      Arthritis Mother      Eye Disorder Mother         glaucoma     Heart Disease Father         ?     Alzheimer Disease Father          from dementia     Hypertension Father      Cerebrovascular Disease Maternal Grandmother      Alzheimer Disease Maternal Grandmother      Diabetes Maternal Grandmother      Alcohol/Drug Maternal Grandfather      Eye Disorder Sister         glaucoma     Arthritis Sister      Eye Disorder Sister         glaucoma     Hypertension Sister      Cancer Sister         Pancreatic      Current Outpatient Medications   Medication Sig Dispense Refill     amLODIPine (NORVASC) 2.5 MG tablet Take 0.5 tablets (1.25 mg) by mouth daily 45 tablet 3     aspirin 81 MG tablet Take 1 tablet (81 mg) by mouth daily 100 tablet 6     atorvastatin (LIPITOR) 10 MG tablet Take 1 tablet (10 mg) by mouth daily 90 tablet 3     hydrochlorothiazide (HYDRODIURIL) 12.5 MG tablet Take 1 tablet (12.5 mg) by mouth daily 90 tablet 3     THERAPEUTIC MULTIVIT/MINERAL OR TABS 1 TABLET BY MOUTH DAILY       valACYclovir (VALTREX) 500 MG tablet Take 1 tablet (500 mg) by mouth 2 times daily 6 tablet 3     Social History     Tobacco Use     Smoking status: Never Smoker     Smokeless tobacco: Never Used   Substance Use Topics     Alcohol use: No     Alcohol/week: 0.0 standard drinks       OBJECTIVE  Temp 98  F (36.7  C) (Tympanic)   Resp 18   Ht 1.676 m (5' 6\")   Wt 78.9 kg (174 lb)   SpO2 95%   BMI 28.08 kg/m      Physical Exam  Constitutional:       Appearance: She is well-developed.   HENT:      Head: Normocephalic.      Right Ear: Tympanic membrane and ear canal normal.      Left Ear: Tympanic membrane and ear canal normal. "   Eyes:      Conjunctiva/sclera: Conjunctivae normal.      Pupils: Pupils are equal, round, and reactive to light.   Cardiovascular:      Rate and Rhythm: Normal rate.      Heart sounds: Normal heart sounds.   Pulmonary:      Effort: Pulmonary effort is normal.      Breath sounds: Normal breath sounds.   Skin:     General: Skin is warm and dry.      Findings: No rash.   Neurological:      Mental Status: She is alert.      Cranial Nerves: Cranial nerves are intact.      Sensory: Sensation is intact.      Motor: Motor function is intact.      Coordination: Coordination is intact.      Gait: Gait is intact.      Deep Tendon Reflexes: Reflexes are normal and symmetric.   Psychiatric:         Behavior: Behavior normal.         Labs:  No results found for this or any previous visit (from the past 24 hour(s)).    X-Ray was not done.    EKG- sinus rhythm     ASSESSMENT:      ICD-10-CM    1. Dizziness R42 EKG 12-lead complete w/read - Clinics     CBC with platelets differential     Comprehensive metabolic panel     TSH with free T4 reflex   2. Other general symptoms and signs  R68.89 TSH with free T4 reflex        Medical Decision Making:    Differential Diagnosis:  Dizziness, vertigo, orthostatic hypotension, dehydration, stroke     Serious Comorbid Conditions:  Adult:  None    PLAN:    Normal EKG and normal neurological exam. Will check labs and contact her with results. Encouraged her to push fluids. Discussed in detail symptoms that would warrant emergent evaluation in the ED. Patient agrees with plan and will follow up as needed.      Followup:    If not improving or if condition worsens, follow up with your Primary Care Provider    There are no Patient Instructions on file for this visit.

## 2019-10-10 ASSESSMENT — ENCOUNTER SYMPTOMS
WEAKNESS: 0
FACIAL ASYMMETRY: 0
LIGHT-HEADEDNESS: 0
ENDOCRINE NEGATIVE: 1
HEADACHES: 0
TREMORS: 0
DIZZINESS: 1
NUMBNESS: 0
SPEECH DIFFICULTY: 0
SEIZURES: 0

## 2019-12-04 ENCOUNTER — ANCILLARY PROCEDURE (OUTPATIENT)
Dept: GENERAL RADIOLOGY | Facility: CLINIC | Age: 74
End: 2019-12-04
Attending: FAMILY MEDICINE
Payer: COMMERCIAL

## 2019-12-04 ENCOUNTER — OFFICE VISIT (OUTPATIENT)
Dept: FAMILY MEDICINE | Facility: CLINIC | Age: 74
End: 2019-12-04
Payer: COMMERCIAL

## 2019-12-04 VITALS
OXYGEN SATURATION: 97 % | BODY MASS INDEX: 28.28 KG/M2 | HEART RATE: 80 BPM | TEMPERATURE: 97.6 F | HEIGHT: 66 IN | WEIGHT: 176 LBS | DIASTOLIC BLOOD PRESSURE: 86 MMHG | RESPIRATION RATE: 18 BRPM | SYSTOLIC BLOOD PRESSURE: 130 MMHG

## 2019-12-04 DIAGNOSIS — R07.89 CHEST WALL PAIN: Primary | ICD-10-CM

## 2019-12-04 DIAGNOSIS — R07.89 CHEST WALL PAIN: ICD-10-CM

## 2019-12-04 PROCEDURE — 71100 X-RAY EXAM RIBS UNI 2 VIEWS: CPT | Mod: LT

## 2019-12-04 PROCEDURE — 99213 OFFICE O/P EST LOW 20 MIN: CPT | Performed by: FAMILY MEDICINE

## 2019-12-04 PROCEDURE — 71046 X-RAY EXAM CHEST 2 VIEWS: CPT

## 2019-12-04 ASSESSMENT — MIFFLIN-ST. JEOR: SCORE: 1305.08

## 2019-12-04 NOTE — NURSING NOTE
"Chief Complaint   Patient presents with     Pain     Chest upper left, can she have an x-ray.       Initial /86   Pulse 80   Temp 97.6  F (36.4  C) (Tympanic)   Resp 18   Ht 1.676 m (5' 6\")   Wt 79.8 kg (176 lb)   SpO2 97%   BMI 28.41 kg/m   Estimated body mass index is 28.41 kg/m  as calculated from the following:    Height as of this encounter: 1.676 m (5' 6\").    Weight as of this encounter: 79.8 kg (176 lb).    Patient presents to the clinic using     Health Maintenance that is potentially due pending provider review:          Is there anyone who you would like to be able to receive your results?   If yes have patient fill out DONIS    "

## 2019-12-04 NOTE — PATIENT INSTRUCTIONS
ASSESSMENT:   (R07.89) Chest wall pain  (primary encounter diagnosis)  Comment: This does seem to be in muscles and not in breast.  I doubt any problems lungs or heart  Plan: XR Chest 2 Views, XR Ribs & Chest Lt 3v        Check rib and chest xrays today.   There are medications we could try.

## 2019-12-04 NOTE — PROGRESS NOTES
"SUBJECTIVE: Sofia Freeman is a 74 year old female  Chief Complaint   Patient presents with     Pain     Chest upper left, can she have an x-ray.      Left sided chest pain      Duration: 2 years    Description (location/character/radiation): pain in upper left chest, hurts to touch the bone. Does a lot of lifting. Seems worse in cold weather with her breathing    Intensity:  2-4/10 when touching    Accompanying signs and symptoms: several times has awoke in the night and did have nausea.    History (similar episodes/previous evaluation): yes has had 3 visits prior    Precipitating or alleviating factors: sometimes hurts to lift and does need to slow down what she is doing.    Therapies tried and outcome: none     Takes tylenol     She had noted chest pain June 20, 2018 left upper chest during a clinic visit.  July 22 I had seen her for a wellness exam she again mentioned pain which seemed in the left upper chest wall.    Course of symptoms over time: no.   Sometimes worse.   Can wake her up at night.   Constant.    It is sore to touch.  Feels hot like on fire.    Triggering factors: ?  Feels like can't get as much air in colder weather.   No shortness of breath.  Some dyspnea on exertion not changed.   Sometimes dry cough.   Mammogram in August was OK.   Alleviating factors: acetaminophen 1000 mg helps.  Takes on occasion.     Patient Active Problem List   Diagnosis     Genital herpes     Hypertension goal BP (blood pressure) < 140/90     HYPERLIPIDEMIA LDL GOAL <130      OBJECTIVE:Blood pressure 130/86, pulse 80, temperature 97.6  F (36.4  C), temperature source Tympanic, resp. rate 18, height 1.676 m (5' 6\"), weight 79.8 kg (176 lb), SpO2 97 %, not currently breastfeeding. BMI=Body mass index is 28.41 kg/m .  GENERAL APPEARANCE ADULT: Alert, no acute distress  NECK: No adenopathy,masses or thyromegaly  BREAST: normal to inspection and palpation, left breast.  Breast is not tender.   RESP: lungs clear to " auscultation   CV: normal rate, regular rhythm, no murmur or gallop  ABDOMEN: soft, no organomegaly, masses or tenderness  MS: Tenderness between ribs 3-4 lateral to sternum and under left breast.  It does reproduce pain.      ASSESSMENT:   (R07.89) Chest wall pain  (primary encounter diagnosis)  Comment: This does seem to be in muscles and not in breast.  I doubt any problems lungs or heart  Plan: XR Chest 2 Views, XR Ribs & Chest Lt 3v        Check rib and chest xrays today.   There are medications we could try.

## 2019-12-05 NOTE — RESULT ENCOUNTER NOTE
Azael Black,  YOur chest x-ray is normal.  No evidence for heart or lung problem.    The rib xrays do not show any changes, fracture or abnormalities.  I think the pain stems from muscles in the chest wall between the ribs.  They are strained or irritated.  Possible nerve irritation.   I do not see signs of something serious.   PLAN: I don't know of a cure but there are medications you could use for pain if desired.    You can try over the counter medication medication like ibuprofen or Aleve which have anti-inflammatory properties.  Tylenol is oK as well, not anti-inflammatory but can help for pain.    We could try medication aimed at nerve pain like gabapentin or possible muscle relaxer like cyclobenzaprine.    WE could do referral to orthopedic physician if you like.     Let me know if you would like to try medication or need a referral.   TERESA GANDHI MD

## 2020-02-28 DIAGNOSIS — E78.5 HYPERLIPIDEMIA LDL GOAL <130: ICD-10-CM

## 2020-02-28 LAB
CHOLEST SERPL-MCNC: 188 MG/DL
HDLC SERPL-MCNC: 73 MG/DL
LDLC SERPL CALC-MCNC: 90 MG/DL
NONHDLC SERPL-MCNC: 115 MG/DL
TRIGL SERPL-MCNC: 126 MG/DL

## 2020-02-28 PROCEDURE — 36415 COLL VENOUS BLD VENIPUNCTURE: CPT | Performed by: FAMILY MEDICINE

## 2020-02-28 PROCEDURE — 80061 LIPID PANEL: CPT | Performed by: FAMILY MEDICINE

## 2020-09-22 DIAGNOSIS — I10 ESSENTIAL HYPERTENSION WITH GOAL BLOOD PRESSURE LESS THAN 140/90: ICD-10-CM

## 2020-09-22 RX ORDER — AMLODIPINE BESYLATE 2.5 MG/1
TABLET ORAL
Qty: 45 TABLET | Refills: 3 | Status: SHIPPED | OUTPATIENT
Start: 2020-09-22 | End: 2021-01-18

## 2020-11-08 ENCOUNTER — HEALTH MAINTENANCE LETTER (OUTPATIENT)
Age: 75
End: 2020-11-08

## 2020-11-09 DIAGNOSIS — I10 ESSENTIAL HYPERTENSION WITH GOAL BLOOD PRESSURE LESS THAN 140/90: ICD-10-CM

## 2020-11-09 RX ORDER — HYDROCHLOROTHIAZIDE 12.5 MG/1
TABLET ORAL
Qty: 30 TABLET | Refills: 0 | Status: SHIPPED | OUTPATIENT
Start: 2020-11-09 | End: 2020-12-29

## 2020-12-25 DIAGNOSIS — I10 ESSENTIAL HYPERTENSION WITH GOAL BLOOD PRESSURE LESS THAN 140/90: ICD-10-CM

## 2020-12-29 RX ORDER — HYDROCHLOROTHIAZIDE 12.5 MG/1
TABLET ORAL
Qty: 30 TABLET | Refills: 0 | Status: SHIPPED | OUTPATIENT
Start: 2020-12-29 | End: 2021-01-18

## 2021-01-15 ASSESSMENT — ENCOUNTER SYMPTOMS
WEAKNESS: 0
NAUSEA: 0
DIZZINESS: 1
FEVER: 0
SHORTNESS OF BREATH: 0
DYSURIA: 0
COUGH: 0
SORE THROAT: 0
HEARTBURN: 0
DIARRHEA: 0
ARTHRALGIAS: 0
HEMATOCHEZIA: 0
FREQUENCY: 1
EYE PAIN: 0
NERVOUS/ANXIOUS: 0
PARESTHESIAS: 0
PALPITATIONS: 1
CONSTIPATION: 0
MYALGIAS: 0
CHILLS: 0
HEADACHES: 0
BREAST MASS: 0
HEMATURIA: 0
ABDOMINAL PAIN: 1
JOINT SWELLING: 0

## 2021-01-15 ASSESSMENT — ACTIVITIES OF DAILY LIVING (ADL): CURRENT_FUNCTION: NO ASSISTANCE NEEDED

## 2021-01-18 ENCOUNTER — OFFICE VISIT (OUTPATIENT)
Dept: FAMILY MEDICINE | Facility: CLINIC | Age: 76
End: 2021-01-18
Payer: COMMERCIAL

## 2021-01-18 VITALS
RESPIRATION RATE: 16 BRPM | DIASTOLIC BLOOD PRESSURE: 82 MMHG | BODY MASS INDEX: 26 KG/M2 | TEMPERATURE: 98.5 F | HEART RATE: 80 BPM | SYSTOLIC BLOOD PRESSURE: 132 MMHG | HEIGHT: 66 IN | WEIGHT: 161.8 LBS

## 2021-01-18 DIAGNOSIS — Z00.00 ENCOUNTER FOR SUBSEQUENT ANNUAL WELLNESS VISIT IN MEDICARE PATIENT: Primary | ICD-10-CM

## 2021-01-18 DIAGNOSIS — E78.5 HYPERLIPIDEMIA LDL GOAL <130: ICD-10-CM

## 2021-01-18 DIAGNOSIS — Z11.59 NEED FOR HEPATITIS C SCREENING TEST: ICD-10-CM

## 2021-01-18 DIAGNOSIS — Z12.11 SPECIAL SCREENING FOR MALIGNANT NEOPLASMS, COLON: ICD-10-CM

## 2021-01-18 DIAGNOSIS — I10 ESSENTIAL HYPERTENSION WITH GOAL BLOOD PRESSURE LESS THAN 140/90: ICD-10-CM

## 2021-01-18 DIAGNOSIS — Z13.0 SCREENING FOR DEFICIENCY ANEMIA: ICD-10-CM

## 2021-01-18 LAB
ANION GAP SERPL CALCULATED.3IONS-SCNC: 2 MMOL/L (ref 3–14)
BUN SERPL-MCNC: 15 MG/DL (ref 7–30)
CALCIUM SERPL-MCNC: 9.9 MG/DL (ref 8.5–10.1)
CHLORIDE SERPL-SCNC: 102 MMOL/L (ref 94–109)
CHOLEST SERPL-MCNC: 221 MG/DL
CO2 SERPL-SCNC: 33 MMOL/L (ref 20–32)
CREAT SERPL-MCNC: 0.73 MG/DL (ref 0.52–1.04)
ERYTHROCYTE [DISTWIDTH] IN BLOOD BY AUTOMATED COUNT: 13.5 % (ref 10–15)
GFR SERPL CREATININE-BSD FRML MDRD: 79 ML/MIN/{1.73_M2}
GLUCOSE SERPL-MCNC: 92 MG/DL (ref 70–99)
HCT VFR BLD AUTO: 45.2 % (ref 35–47)
HCV AB SERPL QL IA: NONREACTIVE
HDLC SERPL-MCNC: 79 MG/DL
HGB BLD-MCNC: 14.7 G/DL (ref 11.7–15.7)
LDLC SERPL CALC-MCNC: 117 MG/DL
MCH RBC QN AUTO: 29.7 PG (ref 26.5–33)
MCHC RBC AUTO-ENTMCNC: 32.5 G/DL (ref 31.5–36.5)
MCV RBC AUTO: 91 FL (ref 78–100)
NONHDLC SERPL-MCNC: 142 MG/DL
PLATELET # BLD AUTO: 305 10E9/L (ref 150–450)
POTASSIUM SERPL-SCNC: 3.7 MMOL/L (ref 3.4–5.3)
RBC # BLD AUTO: 4.95 10E12/L (ref 3.8–5.2)
SODIUM SERPL-SCNC: 137 MMOL/L (ref 133–144)
TRIGL SERPL-MCNC: 126 MG/DL
WBC # BLD AUTO: 9.3 10E9/L (ref 4–11)

## 2021-01-18 PROCEDURE — 80048 BASIC METABOLIC PNL TOTAL CA: CPT | Performed by: FAMILY MEDICINE

## 2021-01-18 PROCEDURE — 80061 LIPID PANEL: CPT | Performed by: FAMILY MEDICINE

## 2021-01-18 PROCEDURE — 99214 OFFICE O/P EST MOD 30 MIN: CPT | Mod: 25 | Performed by: FAMILY MEDICINE

## 2021-01-18 PROCEDURE — G0008 ADMIN INFLUENZA VIRUS VAC: HCPCS | Performed by: FAMILY MEDICINE

## 2021-01-18 PROCEDURE — 90662 IIV NO PRSV INCREASED AG IM: CPT | Performed by: FAMILY MEDICINE

## 2021-01-18 PROCEDURE — 36415 COLL VENOUS BLD VENIPUNCTURE: CPT | Performed by: FAMILY MEDICINE

## 2021-01-18 PROCEDURE — 99397 PER PM REEVAL EST PAT 65+ YR: CPT | Mod: 25 | Performed by: FAMILY MEDICINE

## 2021-01-18 PROCEDURE — 86803 HEPATITIS C AB TEST: CPT | Performed by: FAMILY MEDICINE

## 2021-01-18 PROCEDURE — 85027 COMPLETE CBC AUTOMATED: CPT | Performed by: FAMILY MEDICINE

## 2021-01-18 RX ORDER — ATORVASTATIN CALCIUM 10 MG/1
10 TABLET, FILM COATED ORAL DAILY
Qty: 90 TABLET | Refills: 3 | Status: SHIPPED | OUTPATIENT
Start: 2021-01-18 | End: 2022-03-10

## 2021-01-18 RX ORDER — AMLODIPINE BESYLATE 2.5 MG/1
1.25 TABLET ORAL DAILY
Qty: 45 TABLET | Refills: 3 | Status: SHIPPED | OUTPATIENT
Start: 2021-01-18 | End: 2022-03-01

## 2021-01-18 RX ORDER — HYDROCHLOROTHIAZIDE 12.5 MG/1
TABLET ORAL
Qty: 90 TABLET | Refills: 3 | Status: SHIPPED | OUTPATIENT
Start: 2021-01-18 | End: 2022-03-01

## 2021-01-18 ASSESSMENT — ENCOUNTER SYMPTOMS
PARESTHESIAS: 0
COUGH: 0
FEVER: 0
FREQUENCY: 1
CONSTIPATION: 0
CHILLS: 0
HEMATURIA: 0
DIARRHEA: 0
JOINT SWELLING: 0
SHORTNESS OF BREATH: 0
EYE PAIN: 0
HEADACHES: 0
NERVOUS/ANXIOUS: 0
DYSURIA: 0
ARTHRALGIAS: 0
HEMATOCHEZIA: 0
DIZZINESS: 1
BREAST MASS: 0
HEARTBURN: 0
PALPITATIONS: 1
SORE THROAT: 0
NAUSEA: 0
MYALGIAS: 0
WEAKNESS: 0
ABDOMINAL PAIN: 1

## 2021-01-18 ASSESSMENT — ACTIVITIES OF DAILY LIVING (ADL): CURRENT_FUNCTION: NO ASSISTANCE NEEDED

## 2021-01-18 ASSESSMENT — MIFFLIN-ST. JEOR: SCORE: 1227.73

## 2021-01-18 NOTE — PATIENT INSTRUCTIONS
ASSESSMENT/PLAN:                                                    Lifestyle recommendations:continue current healthy lifestyle efforts including regular exercise and keeping a good weight  The following exams/tests were recommended and discussed for health maintenance:  FIT testing to check for hidden blood in the stool is a colon cancer screening test which should be done once yearly if normal.  If abnormal, a colonoscopy is recommended.    When to stop colon cancer screening?  Experts agree that colon cancer screening is generally not recommended when life expectancy is <10 years and not at age over 85.  The Expert group USPSTF recommends against screening for ages 76 and older.  It is reasonable to stop screening for colon cancer sometime between age 76 and 85 for most individuals.   Mammogram screening recommendations differ among expert groups.  They may be started at age 40 or 50.  Screening can be yearly or every other year depending upon a person's risk and preference.   When to stop breast cancer screening/mammograms?  Many expert groups recommend screening only up to age 74.  Some experts advocate screening up until estimated 5-10 years anticipated remaining lifespan.     (Z00.00) Encounter for subsequent annual wellness visit in Medicare patient  (primary encounter diagnosis)  Comment: doing well    (I10) Essential hypertension with goal blood pressure less than 140/90  Comment: well controlled.   Plan: amLODIPine (NORVASC) 2.5 MG tablet,         hydrochlorothiazide (HYDRODIURIL) 12.5 MG         tablet, Basic metabolic panel        No change in current treatment plan.  Refills.   Monitor BP periodically.  This can be done at home, in clinic, at our pharmacy, at a store or by neighbor or relative with a blood pressure cuff.  You should be sitting and relaxed for several minutes when taking blood pressure.   Goal BP (most readings) should be less than 140/90    Normal blood pressure is 120/80.    If your  blood pressure is consistently at or above the goal, some changes should be made with lifestyle or medication to keep blood pressure under good control.    High blood pressure over time can lead to eye and kidney damage and increase risk for heart attacks and strokes.   Let us know if readings are often high, so we can help get your blood pressure under good control.      (E78.5) Hyperlipidemia LDL goal <130  Comment: due for follow-up.  Taking 5mg atorvastatin daily.   Plan: atorvastatin (LIPITOR) 10 MG tablet, Lipid         panel reflex to direct LDL Fasting        Fasting blood tests today.     (Z13.0) Screening for deficiency anemia  Comment:   Plan: CBC with platelets          (Z12.11) Special screening for malignant neoplasms, colon  Comment:   Plan: Fecal colorectal cancer screen (FIT)        Complete FIT colon cancer screening test and send in the mail.

## 2021-01-18 NOTE — NURSING NOTE
"Chief Complaint   Patient presents with     Physical     Medication Reconciliation     Atorvastatin taking 10 mg tablet cuts in half.       Initial /82   Pulse 80   Temp 98.5  F (36.9  C) (Tympanic)   Resp 16   Ht 1.664 m (5' 5.5\")   Wt 73.4 kg (161 lb 12.8 oz)   BMI 26.52 kg/m   Estimated body mass index is 26.52 kg/m  as calculated from the following:    Height as of this encounter: 1.664 m (5' 5.5\").    Weight as of this encounter: 73.4 kg (161 lb 12.8 oz).    Patient presents to the clinic using     Health Maintenance that is potentially due pending provider review:          Is there anyone who you would like to be able to receive your results? If yes have patient fill out DONIS    "

## 2021-01-18 NOTE — PROGRESS NOTES
"SUBJECTIVE:   Sofia Freeman is a 75 year old female who presents for Preventive Visit.  Chief Complaint   Patient presents with     Physical     Medication Reconciliation     Atorvastatin taking 10 mg tablet cuts in half.     Last visit 2019 for chest wall pain.   Last wellness exam 2019     Spouse  in December.     She has been checking blood pressure. In the past month, 112-143///21 readings high.  Pulse avg about 80.  She has been taking amlodipine 2.5mg daily and hydrochlorothiazide 12.5mg daily.     Hyperlipidemia. Takes atorvastatin 5mg daily.  Thinks she has some lightheadedness with 10mg dose.   occasional sharp pain right lateral chest.     Patient has been advised of split billing requirements and indicates understanding: Yes   Are you in the first 12 months of your Medicare coverage?  No    Healthy Habits:     In general, how would you rate your overall health?  Good    Frequency of exercise:  6-7 days/week    Duration of exercise:  30-45 minutes    Do you usually eat at least 4 servings of fruit and vegetables a day, include whole grains    & fiber and avoid regularly eating high fat or \"junk\" foods?  Yes    Taking medications regularly:  Yes    Medication side effects:  Lightheadedness    Ability to successfully perform activities of daily living:  No assistance needed    Home Safety:  No safety concerns identified    Hearing Impairment:  Difficulty understanding speech on the telephone    In the past 6 months, have you been bothered by leaking of urine?  No    In general, how would you rate your overall mental or emotional health?  Good      PHQ-2 Total Score: 0    Additional concerns today:  Yes  Exercise: cleans barn, throws hay cinthia, carrying water buckets.     Do you feel safe in your environment? Yes    Have you ever done Advance Care Planning? (For example, a Health Directive, POLST, or a discussion with a medical provider or your loved ones about your wishes): " yes    Fall risk  Fallen 2 or more times in the past year?: No  Any fall with injury in the past year?: No    Cognitive Screening   1) Repeat 3 items (Leader, Season, Table)    2) Clock draw: NORMAL  3) 3 item recall: Recalls 3 objects  Results: 3 items recalled: COGNITIVE IMPAIRMENT LESS LIKELY    Mini-CogTM Copyright DENIA Holguin. Licensed by the author for use in [facility]; reprinted with permission ([email or web address]). All rights reserved.      Do you have sleep apnea, excessive snoring or daytime drowsiness?: no    Social History     Tobacco Use     Smoking status: Never Smoker     Smokeless tobacco: Never Used   Substance Use Topics     Alcohol use: No     Alcohol/week: 0.0 standard drinks     If you drink alcohol do you typically have >3 drinks per day or >7 drinks per week? No    Alcohol Use 1/15/2021   Prescreen: >3 drinks/day or >7 drinks/week? Not Applicable   Prescreen: >3 drinks/day or >7 drinks/week? -   No flowsheet data found.    Current providers sharing in care for this patient include:   Patient Care Team:  Danie Galvan MD as PCP - General  Danie Galvan MD as Assigned PCP  No specialists.     The following health maintenance items are reviewed in Epic and correct as of today:  Health Maintenance   Topic Date Due     HEPATITIS C SCREENING  07/28/1963     ADVANCE CARE PLANNING  05/17/2018     FALL RISK ASSESSMENT  07/22/2020     INFLUENZA VACCINE (1) 09/01/2020     MEDICARE ANNUAL WELLNESS VISIT  01/18/2022     DTAP/TDAP/TD IMMUNIZATION (4 - Td) 01/07/2023     LIPID  02/28/2025     DEXA  08/15/2034     MIGRAINE ACTION PLAN  Completed     PHQ-2  Completed     Pneumococcal Vaccine: 65+ Years  Completed     ZOSTER IMMUNIZATION  Completed     Pneumococcal Vaccine: Pediatrics (0 to 5 Years) and At-Risk Patients (6 to 64 Years)  Aged Out     IPV IMMUNIZATION  Aged Out     MENINGITIS IMMUNIZATION  Aged Out     HEPATITIS B IMMUNIZATION  Aged Out     Patient Active Problem List    Diagnosis  "Date Noted     HYPERLIPIDEMIA LDL GOAL <130 10/31/2010     Priority: Medium     Hypertension goal BP (blood pressure) < 140/90 06/19/2006     Priority: Medium     Genital herpes      Priority: Medium       History   Smoking Status     Never Smoker   Smokeless Tobacco     Never Used       Family history:  CV disease: father  Breast cancer: no  Colon cancer: no  Ovarian/uterine/cervical cancer: no    Multivitamin or Vit D use: MVI    Vaccines:current    Lab Results   Component Value Date    PAP NIL 11/30/2009     Past colon cancer screen:FIT July 2019  Past mammogram:August, 2019    Review of Systems   Constitutional: Negative for chills and fever.   HENT: Negative for congestion, ear pain, hearing loss and sore throat.    Eyes: Positive for visual disturbance. Negative for pain.   Respiratory: Negative for cough and shortness of breath.    Cardiovascular: Positive for palpitations. Negative for chest pain and peripheral edema.   Gastrointestinal: Positive for abdominal pain. Negative for constipation, diarrhea, heartburn, hematochezia and nausea.   Breasts:  Negative for tenderness, breast mass and discharge.   Genitourinary: Positive for frequency and urgency. Negative for dysuria, genital sores, hematuria, pelvic pain, vaginal bleeding and vaginal discharge.   Musculoskeletal: Negative for arthralgias, joint swelling and myalgias.   Skin: Negative for rash.   Neurological: Positive for dizziness. Negative for weakness, headaches and paresthesias.   Psychiatric/Behavioral: Negative for mood changes. The patient is not nervous/anxious.    Some burping.   Heart pounds sometimes.   Not exertional.     Urinary symptoms unchanged.  Weight down 15# in the past year.       OBJECTIVE:                                                    OBJECTIVE:Blood pressure 132/82, pulse 80, temperature 98.5  F (36.9  C), temperature source Tympanic, resp. rate 16, height 1.664 m (5' 5.5\"), weight 73.4 kg (161 lb 12.8 oz), not currently " breastfeeding. BMI=Body mass index is 26.52 kg/m .  GENERAL APPEARANCE ADULT: Alert, no acute distress  EYES: PERRL, EOM normal, conjunctiva and lids normal, lens implant bilateral  HENT: Ears and TMs normal, oral mucosa and posterior oropharynx normal  NECK: No adenopathy,masses or thyromegaly  RESP: lungs clear to auscultation   CV: normal rate, regular rhythm, no murmur or gallop  ABDOMEN: soft, no organomegaly, masses or tenderness  MS: extremities normal, no peripheral edema     ASSESSMENT/PLAN:                                                    Lifestyle recommendations:continue current healthy lifestyle efforts including regular exercise and keeping a good weight  The following exams/tests were recommended and discussed for health maintenance:  FIT testing to check for hidden blood in the stool is a colon cancer screening test which should be done once yearly if normal.  If abnormal, a colonoscopy is recommended.    When to stop colon cancer screening?  Experts agree that colon cancer screening is generally not recommended when life expectancy is <10 years and not at age over 85.  The Expert group USPSTF recommends against screening for ages 76 and older.  It is reasonable to stop screening for colon cancer sometime between age 76 and 85 for most individuals.   Mammogram screening recommendations differ among expert groups.  They may be started at age 40 or 50.  Screening can be yearly or every other year depending upon a person's risk and preference.   When to stop breast cancer screening/mammograms?  Many expert groups recommend screening only up to age 74.  Some experts advocate screening up until estimated 5-10 years anticipated remaining lifespan.     (Z00.00) Encounter for subsequent annual wellness visit in Medicare patient  (primary encounter diagnosis)  Comment: doing well    (I10) Essential hypertension with goal blood pressure less than 140/90  Comment: well controlled.   Plan: amLODIPine (NORVASC)  "2.5 MG tablet,         hydrochlorothiazide (HYDRODIURIL) 12.5 MG         tablet, Basic metabolic panel        No change in current treatment plan.  Refills.   Monitor BP periodically.  This can be done at home, in clinic, at our pharmacy, at a store or by neighbor or relative with a blood pressure cuff.  You should be sitting and relaxed for several minutes when taking blood pressure.   Goal BP (most readings) should be less than 140/90    Normal blood pressure is 120/80.    If your blood pressure is consistently at or above the goal, some changes should be made with lifestyle or medication to keep blood pressure under good control.    High blood pressure over time can lead to eye and kidney damage and increase risk for heart attacks and strokes.   Let us know if readings are often high, so we can help get your blood pressure under good control.      (E78.5) Hyperlipidemia LDL goal <130  Comment: due for follow-up.  Taking 5mg atorvastatin daily.   Plan: atorvastatin (LIPITOR) 10 MG tablet, Lipid         panel reflex to direct LDL Fasting        Fasting blood tests today.     (Z13.0) Screening for deficiency anemia  Comment:   Plan: CBC with platelets          (Z12.11) Special screening for malignant neoplasms, colon  Comment:   Plan: Fecal colorectal cancer screen (FIT)        Complete FIT colon cancer screening test and send in the mail.           Patient has been advised of split billing requirements and indicates understanding: Yes  COUNSELING:  Reviewed preventive health counseling, as reflected in patient instructions  Special attention given to:       Colon cancer screening       Hepatitis C screening    Estimated body mass index is 26.52 kg/m  as calculated from the following:    Height as of this encounter: 1.664 m (5' 5.5\").    Weight as of this encounter: 73.4 kg (161 lb 12.8 oz).        She reports that she has never smoked. She has never used smokeless tobacco.      Appropriate preventive services were " discussed with this patient, including applicable screening as appropriate for cardiovascular disease, diabetes, osteopenia/osteoporosis, and glaucoma.  As appropriate for age/gender, discussed screening for colorectal cancer, prostate cancer, breast cancer, and cervical cancer. Checklist reviewing preventive services available has been given to the patient.    Reviewed patients plan of care and provided an AVS. The Basic Care Plan (routine screening as documented in Health Maintenance) for Sofia meets the Care Plan requirement. This Care Plan has been established and reviewed with the Patient.    Counseling Resources:  ATP IV Guidelines  Pooled Cohorts Equation Calculator  Breast Cancer Risk Calculator  Breast Cancer: Medication to Reduce Risk  FRAX Risk Assessment  ICSI Preventive Guidelines  Dietary Guidelines for Americans, 2010  USDA's MyPlate  ASA Prophylaxis  Lung CA Screening    Danie Galvan MD  [facility]    Identified Health Risks:

## 2021-01-19 NOTE — RESULT ENCOUNTER NOTE
"Azael Black,  Hepatitis C test is negative.   LDL (\"bad cholesterol\") is high.  This increases risk for cardiovascular disease (heart attacks and strokes)..   Your triglycerides and HDL \"good cholesterol\" are normal.    The blood chemistries (Basic metabolic panel) are all normal including electrolytes (salt balances in the blood), blood glucose and kidney tests.  Your blood counts including the white blood cells, red blood cells and platelets are all normal.     PLAN: Your cholesterol and lipids were a lot worse in the past when you were not taking any cholesterol lowering medication.  If you are taking the atorvastatin, I suggest at least increasing to the full 10mg pill.  I think this will help lower your LDL to below 100.  If you have side effects, let me know, there are alternative medications.   TERESA GANDHI MD"

## 2021-03-08 ENCOUNTER — MYC MEDICAL ADVICE (OUTPATIENT)
Dept: FAMILY MEDICINE | Facility: CLINIC | Age: 76
End: 2021-03-08

## 2021-06-24 ENCOUNTER — OFFICE VISIT (OUTPATIENT)
Dept: FAMILY MEDICINE | Facility: CLINIC | Age: 76
End: 2021-06-24
Payer: COMMERCIAL

## 2021-06-24 VITALS
RESPIRATION RATE: 18 BRPM | HEART RATE: 80 BPM | WEIGHT: 162 LBS | HEIGHT: 66 IN | BODY MASS INDEX: 26.03 KG/M2 | SYSTOLIC BLOOD PRESSURE: 120 MMHG | TEMPERATURE: 97.2 F | DIASTOLIC BLOOD PRESSURE: 82 MMHG

## 2021-06-24 DIAGNOSIS — E78.5 HYPERLIPIDEMIA LDL GOAL <130: ICD-10-CM

## 2021-06-24 DIAGNOSIS — L73.9 FOLLICULITIS: Primary | ICD-10-CM

## 2021-06-24 DIAGNOSIS — I10 HYPERTENSION GOAL BP (BLOOD PRESSURE) < 140/90: ICD-10-CM

## 2021-06-24 DIAGNOSIS — Z12.31 ENCOUNTER FOR SCREENING MAMMOGRAM FOR BREAST CANCER: ICD-10-CM

## 2021-06-24 LAB
CHOLEST SERPL-MCNC: 183 MG/DL
HDLC SERPL-MCNC: 78 MG/DL
LDLC SERPL CALC-MCNC: 79 MG/DL
NONHDLC SERPL-MCNC: 105 MG/DL
TRIGL SERPL-MCNC: 131 MG/DL

## 2021-06-24 PROCEDURE — 80061 LIPID PANEL: CPT | Performed by: FAMILY MEDICINE

## 2021-06-24 PROCEDURE — 36415 COLL VENOUS BLD VENIPUNCTURE: CPT | Performed by: FAMILY MEDICINE

## 2021-06-24 PROCEDURE — 99213 OFFICE O/P EST LOW 20 MIN: CPT | Performed by: FAMILY MEDICINE

## 2021-06-24 ASSESSMENT — MIFFLIN-ST. JEOR: SCORE: 1228.64

## 2021-06-24 NOTE — PATIENT INSTRUCTIONS
ASSESSMENT:   (L73.9) Folliculitis  (primary encounter diagnosis)  Comment: the lump on your left breast is in the skin and not in the breast tissue.  It is a small pimple or infection/inflammation which is healing OK.  The redness should continue to disappear and the lump will resolve in the next week or two. Nothing worrisome on your breast exam today  Plan: Schedule an appointment with diagnostics for a mammogram-it has been nearly two years since you last one.   Call [phone] to schedule.      (E98.5) Hyperlipidemia LDL goal <130  Comment: on atorvastatin 5mg daily  Plan: Lipid panel reflex to direct LDL Fasting        Fasting blood test today.     (Z12.31) Encounter for screening mammogram for breast cancer  Comment:   Plan: *MA Screening Digital Bilateral          (I10) Hypertension goal BP (blood pressure) < 140/90  Comment: doing well  Plan: No change in current treatment plan.

## 2021-06-24 NOTE — PROGRESS NOTES
ASSESSMENT:   (L73.9) Folliculitis  (primary encounter diagnosis)  Comment: the lump on your left breast is in the skin and not in the breast tissue.  It is a small pimple or infection/inflammation which is healing OK.  The redness should continue to disappear and the lump will resolve in the next week or two. Nothing worrisome on your breast exam today  Plan: Schedule an appointment with diagnostics for a mammogram-it has been nearly two years since you last one.   Call [phone] to schedule.      (I68.5) Hyperlipidemia LDL goal <130  Comment: on atorvastatin 5mg daily  Plan: Lipid panel reflex to direct LDL Fasting        Fasting blood test today.     (Z12.31) Encounter for screening mammogram for breast cancer  Comment:   Plan: *MA Screening Digital Bilateral          (I10) Hypertension goal BP (blood pressure) < 140/90  Comment: doing well  Plan: No change in current treatment plan.       Subjective   meryl is a 75 year old who presents for the following health issues  Chief Complaint   Patient presents with     Mass      Would like blood tests today.   Would like cholesterol checked.  Taking atorvastatin 5mg daily.  Seems to have some side effects from the 10mg pills.  Gets pain in side.     Onset of symptoms: 2 weeks.  Was tender initially-seemed inflamed.  Surface of breast.  Pea size.  Redness has decreased.  Lump remains.   Concern - Breast Mass left breast  Onset: Two weeks  Description: red area on outside of skin  Intensity: mild  Progression of Symptoms:  improving  Accompanying Signs & Symptoms: lump   Previous history of similar problem: none  Precipitating factors:        Worsened by:   Alleviating factors:        Improved by:   Therapies tried and outcome:  none     Patient Active Problem List   Diagnosis     Genital herpes     Hypertension goal BP (blood pressure) < 140/90     HYPERLIPIDEMIA LDL GOAL <130      ROS:Still grieving.   One horse.  Neighbor's cows.  One cat.  occasional heartburn.   "  Not eating as much.      OBJECTIVE:Blood pressure 120/82, pulse 80, temperature 97.2  F (36.2  C), temperature source Tympanic, resp. rate 18, height 1.664 m (5' 5.5\"), weight 73.5 kg (162 lb), not currently breastfeeding. BMI=Body mass index is 26.55 kg/m .  GENERAL APPEARANCE ADULT: Alert, no acute distress  BREAST: Both breasts normal in appearance and normal to palpation.   The red spot in question is 5mm lower left breast, confined to skin and looks like resolving folliculitis.  Non tender.  She has another red spot medial inferior left breast from similar skin infection.          "

## 2021-06-24 NOTE — NURSING NOTE
"Chief Complaint   Patient presents with     Mass       Initial /82   Pulse 80   Temp 97.2  F (36.2  C) (Tympanic)   Resp 18   Ht 1.664 m (5' 5.5\")   Wt 73.5 kg (162 lb)   BMI 26.55 kg/m   Estimated body mass index is 26.55 kg/m  as calculated from the following:    Height as of this encounter: 1.664 m (5' 5.5\").    Weight as of this encounter: 73.5 kg (162 lb).    Patient presents to the clinic using     Health Maintenance that is potentially due pending provider review:          Is there anyone who you would like to be able to receive your results?   If yes have patient fill out DONIS    "

## 2021-08-02 ENCOUNTER — ANCILLARY PROCEDURE (OUTPATIENT)
Dept: MAMMOGRAPHY | Facility: CLINIC | Age: 76
End: 2021-08-02
Attending: FAMILY MEDICINE
Payer: COMMERCIAL

## 2021-08-02 DIAGNOSIS — Z12.31 ENCOUNTER FOR SCREENING MAMMOGRAM FOR BREAST CANCER: ICD-10-CM

## 2021-08-02 PROCEDURE — 77063 BREAST TOMOSYNTHESIS BI: CPT | Mod: TC | Performed by: RADIOLOGY

## 2021-08-02 PROCEDURE — 77067 SCR MAMMO BI INCL CAD: CPT | Mod: TC | Performed by: RADIOLOGY

## 2021-09-11 ENCOUNTER — HEALTH MAINTENANCE LETTER (OUTPATIENT)
Age: 76
End: 2021-09-11

## 2022-02-26 ENCOUNTER — HEALTH MAINTENANCE LETTER (OUTPATIENT)
Age: 77
End: 2022-02-26

## 2022-02-28 DIAGNOSIS — I10 ESSENTIAL HYPERTENSION WITH GOAL BLOOD PRESSURE LESS THAN 140/90: ICD-10-CM

## 2022-03-01 RX ORDER — AMLODIPINE BESYLATE 2.5 MG/1
TABLET ORAL
Qty: 45 TABLET | Refills: 3 | Status: SHIPPED | OUTPATIENT
Start: 2022-03-01 | End: 2022-03-10

## 2022-03-01 RX ORDER — HYDROCHLOROTHIAZIDE 12.5 MG/1
TABLET ORAL
Qty: 90 TABLET | Refills: 3 | Status: SHIPPED | OUTPATIENT
Start: 2022-03-01 | End: 2023-03-13

## 2022-03-01 NOTE — TELEPHONE ENCOUNTER
"Has appointment scheduled for 3/10/22      Requested Prescriptions   Pending Prescriptions Disp Refills     amLODIPine (NORVASC) 2.5 MG tablet [Pharmacy Med Name: AMLODIPINE BESYLATE 2.5MG TABS] 45 tablet 3     Sig: TAKE ONE-HALF TABLET BY MOUTH EVERY DAY       Calcium Channel Blockers Protocol  Failed - 2/28/2022  1:55 PM        Failed - Normal serum creatinine on file in past 12 months     Recent Labs   Lab Test 01/18/21  0836   CR 0.73       Ok to refill medication if creatinine is low          Passed - Blood pressure under 140/90 in past 12 months     BP Readings from Last 3 Encounters:   06/24/21 120/82   01/18/21 132/82   12/04/19 130/86                 Passed - Recent (12 mo) or future (30 days) visit within the authorizing provider's specialty     Patient has had an office visit with the authorizing provider or a provider within the authorizing providers department within the previous 12 mos or has a future within next 30 days. See \"Patient Info\" tab in inbasket, or \"Choose Columns\" in Meds & Orders section of the refill encounter.              Passed - Medication is active on med list        Passed - Patient is age 18 or older        Passed - No active pregnancy on record        Passed - No positive pregnancy test in past 12 months           hydrochlorothiazide (HYDRODIURIL) 12.5 MG tablet [Pharmacy Med Name: HYDROCHLOROTHIAZIDE 12.5MG TABS] 90 tablet 3     Sig: TAKE ONE TABLET BY MOUTH ONCE DAILY       Diuretics (Including Combos) Protocol Failed - 2/28/2022  1:55 PM        Failed - Normal serum creatinine on file in past 12 months     Recent Labs   Lab Test 01/18/21  0836   CR 0.73              Failed - Normal serum potassium on file in past 12 months     Recent Labs   Lab Test 01/18/21  0836   POTASSIUM 3.7                    Failed - Normal serum sodium on file in past 12 months     Recent Labs   Lab Test 01/18/21  0836                 Passed - Blood pressure under 140/90 in past 12 months     BP " "Readings from Last 3 Encounters:   06/24/21 120/82   01/18/21 132/82   12/04/19 130/86                 Passed - Recent (12 mo) or future (30 days) visit within the authorizing provider's specialty     Patient has had an office visit with the authorizing provider or a provider within the authorizing providers department within the previous 12 mos or has a future within next 30 days. See \"Patient Info\" tab in inbasket, or \"Choose Columns\" in Meds & Orders section of the refill encounter.              Passed - Medication is active on med list        Passed - Patient is age 18 or older        Passed - No active pregancy on record        Passed - No positive pregnancy test in past 12 months             "

## 2022-03-07 ASSESSMENT — ENCOUNTER SYMPTOMS
BREAST MASS: 0
FEVER: 0
DYSURIA: 0
NAUSEA: 1
HEMATURIA: 0
EYE PAIN: 0
MYALGIAS: 1
COUGH: 1
ARTHRALGIAS: 0
CHILLS: 0
DIARRHEA: 0
DIZZINESS: 0
HEMATOCHEZIA: 0
WEAKNESS: 0
FREQUENCY: 0
ABDOMINAL PAIN: 1
HEARTBURN: 0
NERVOUS/ANXIOUS: 0
SHORTNESS OF BREATH: 0
PARESTHESIAS: 1
HEADACHES: 0
CONSTIPATION: 0
SORE THROAT: 0
JOINT SWELLING: 0
PALPITATIONS: 0

## 2022-03-07 ASSESSMENT — ACTIVITIES OF DAILY LIVING (ADL): CURRENT_FUNCTION: NO ASSISTANCE NEEDED

## 2022-03-10 ENCOUNTER — OFFICE VISIT (OUTPATIENT)
Dept: FAMILY MEDICINE | Facility: CLINIC | Age: 77
End: 2022-03-10
Payer: COMMERCIAL

## 2022-03-10 VITALS
SYSTOLIC BLOOD PRESSURE: 142 MMHG | WEIGHT: 168 LBS | DIASTOLIC BLOOD PRESSURE: 90 MMHG | BODY MASS INDEX: 27 KG/M2 | HEART RATE: 74 BPM | RESPIRATION RATE: 16 BRPM | HEIGHT: 66 IN | TEMPERATURE: 97.6 F

## 2022-03-10 DIAGNOSIS — E80.6 HYPERBILIRUBINEMIA: ICD-10-CM

## 2022-03-10 DIAGNOSIS — R20.2 NUMBNESS AND TINGLING OF BOTH FEET: ICD-10-CM

## 2022-03-10 DIAGNOSIS — R20.0 NUMBNESS AND TINGLING OF BOTH FEET: ICD-10-CM

## 2022-03-10 DIAGNOSIS — I10 ESSENTIAL HYPERTENSION WITH GOAL BLOOD PRESSURE LESS THAN 140/90: ICD-10-CM

## 2022-03-10 DIAGNOSIS — E78.5 HYPERLIPIDEMIA LDL GOAL <130: ICD-10-CM

## 2022-03-10 DIAGNOSIS — Z00.00 ENCOUNTER FOR SUBSEQUENT ANNUAL WELLNESS VISIT IN MEDICARE PATIENT: Primary | ICD-10-CM

## 2022-03-10 DIAGNOSIS — E80.6 HYPERBILIRUBINEMIA: Primary | ICD-10-CM

## 2022-03-10 LAB
ALBUMIN SERPL-MCNC: 4 G/DL (ref 3.4–5)
ALP SERPL-CCNC: 64 U/L (ref 40–150)
ALT SERPL W P-5'-P-CCNC: 25 U/L (ref 0–50)
ANION GAP SERPL CALCULATED.3IONS-SCNC: 4 MMOL/L (ref 3–14)
AST SERPL W P-5'-P-CCNC: 22 U/L (ref 0–45)
BILIRUB DIRECT SERPL-MCNC: 0.4 MG/DL (ref 0–0.2)
BILIRUB SERPL-MCNC: 2.3 MG/DL (ref 0.2–1.3)
BUN SERPL-MCNC: 15 MG/DL (ref 7–30)
CALCIUM SERPL-MCNC: 9.2 MG/DL (ref 8.5–10.1)
CHLORIDE BLD-SCNC: 104 MMOL/L (ref 94–109)
CHOLEST SERPL-MCNC: 179 MG/DL
CO2 SERPL-SCNC: 32 MMOL/L (ref 20–32)
CREAT SERPL-MCNC: 0.72 MG/DL (ref 0.52–1.04)
FASTING STATUS PATIENT QL REPORTED: YES
GFR SERPL CREATININE-BSD FRML MDRD: 85 ML/MIN/1.73M2
GLUCOSE BLD-MCNC: 90 MG/DL (ref 70–99)
HDLC SERPL-MCNC: 73 MG/DL
LDLC SERPL CALC-MCNC: 81 MG/DL
NONHDLC SERPL-MCNC: 106 MG/DL
POTASSIUM BLD-SCNC: 3.7 MMOL/L (ref 3.4–5.3)
PROT SERPL-MCNC: 8 G/DL (ref 6.8–8.8)
SODIUM SERPL-SCNC: 140 MMOL/L (ref 133–144)
TRIGL SERPL-MCNC: 125 MG/DL
TSH SERPL DL<=0.005 MIU/L-ACNC: 1.64 MU/L (ref 0.4–4)
VIT B12 SERPL-MCNC: 538 PG/ML (ref 193–986)

## 2022-03-10 PROCEDURE — 36415 COLL VENOUS BLD VENIPUNCTURE: CPT | Performed by: FAMILY MEDICINE

## 2022-03-10 PROCEDURE — 82607 VITAMIN B-12: CPT | Performed by: FAMILY MEDICINE

## 2022-03-10 PROCEDURE — 80053 COMPREHEN METABOLIC PANEL: CPT | Performed by: FAMILY MEDICINE

## 2022-03-10 PROCEDURE — 80061 LIPID PANEL: CPT | Performed by: FAMILY MEDICINE

## 2022-03-10 PROCEDURE — 84443 ASSAY THYROID STIM HORMONE: CPT | Performed by: FAMILY MEDICINE

## 2022-03-10 PROCEDURE — 99397 PER PM REEVAL EST PAT 65+ YR: CPT | Performed by: FAMILY MEDICINE

## 2022-03-10 PROCEDURE — 82248 BILIRUBIN DIRECT: CPT | Performed by: FAMILY MEDICINE

## 2022-03-10 PROCEDURE — 99214 OFFICE O/P EST MOD 30 MIN: CPT | Mod: 25 | Performed by: FAMILY MEDICINE

## 2022-03-10 RX ORDER — ATORVASTATIN CALCIUM 10 MG/1
10 TABLET, FILM COATED ORAL DAILY
Qty: 45 TABLET | Refills: 3 | Status: SHIPPED | OUTPATIENT
Start: 2022-03-10 | End: 2023-03-13

## 2022-03-10 RX ORDER — AMLODIPINE BESYLATE 5 MG/1
5 TABLET ORAL DAILY
Qty: 90 TABLET | Refills: 3 | Status: SHIPPED | OUTPATIENT
Start: 2022-03-10 | End: 2023-03-13

## 2022-03-10 ASSESSMENT — ENCOUNTER SYMPTOMS
PARESTHESIAS: 1
WEAKNESS: 0
FREQUENCY: 0
MYALGIAS: 1
COUGH: 1
HEADACHES: 0
DYSURIA: 0
NERVOUS/ANXIOUS: 0
FEVER: 0
JOINT SWELLING: 0
DIZZINESS: 0
HEMATURIA: 0
CHILLS: 0
HEMATOCHEZIA: 0
NAUSEA: 1
BREAST MASS: 0
HEARTBURN: 0
EYE PAIN: 0
DIARRHEA: 0
CONSTIPATION: 0
PALPITATIONS: 0
ARTHRALGIAS: 0
SORE THROAT: 0
SHORTNESS OF BREATH: 0
ABDOMINAL PAIN: 1

## 2022-03-10 ASSESSMENT — ACTIVITIES OF DAILY LIVING (ADL): CURRENT_FUNCTION: NO ASSISTANCE NEEDED

## 2022-03-10 ASSESSMENT — PAIN SCALES - GENERAL: PAINLEVEL: NO PAIN (0)

## 2022-03-10 NOTE — PROGRESS NOTES
"SUBJECTIVE:   Sofia Freeman is a 76 year old female who presents for Preventive Visit.  Chief Complaint   Patient presents with     Physical      Last clinic visit: 6/24 for skin infection-folliculitis.    Wellness exam 1/18/21    hypertension:  She has been checking blood pressure at home.  Past month=120-155/76-95,  Pulses have been 71-97   6/20 readings over 140/90.  Taking amlodipine 2.5mg and hydrochlorothiazide 12.5mg daily.   No side effects noted.     Hyperlipidemia.  Taking atorvastatin 5mg daily-takes 1/2 pill for nausea which she thinks is related.     Feels like water moving in ears.  In the past year.  Not ringing.  Hearing decreased.  More difficulty on telephone and TV volume.     Numbness in feet.  Plantar feet.  Bilateral.  Not painful.     Skin with rough patches on upper back.      Has had herpes flare at tailbone.  Gets itchy burning blisters.  Associated with nausea.  Asking for medication for nausea.  Tums seems to help.  She is OK just taking the Tums.     Dry cough for months.     Feels stressed.  Not feeling depressed.      Patient has been advised of split billing requirements and indicates understanding: Yes  Are you in the first 12 months of your Medicare coverage?  No    Healthy Habits:     In general, how would you rate your overall health?  Good    Frequency of exercise:  2-3 days/week    Duration of exercise:  15-30 minutes    Do you usually eat at least 4 servings of fruit and vegetables a day, include whole grains    & fiber and avoid regularly eating high fat or \"junk\" foods?  Yes    Taking medications regularly:  Yes    Medication side effects:  Not applicable    Ability to successfully perform activities of daily living:  No assistance needed    Home Safety:  No safety concerns identified    Hearing Impairment:  Difficulty understanding speech on the telephone    In the past 6 months, have you been bothered by leaking of urine?  No    In general, how would you rate your " overall mental or emotional health?  Good      PHQ-2 Total Score: 0    Additional concerns today:  Yes  Exercise.  Not much.  Horse .  Not needing to do cares daily.  Lost her dog as well.     Do you feel safe in your environment? Yes    Have you ever done Advance Care Planning? (For example, a Health Directive, POLST, or a discussion with a medical provider or your loved ones about your wishes): Has in her Will at home    Fall risk  No falls.     Cognitive Screening   1) Repeat 3 items (Leader, Season, Table)    2) Clock draw: NORMAL  3) 3 item recall: Recalls 2 objects   Results: NORMAL clock, 1-2 items recalled: COGNITIVE IMPAIRMENT LESS LIKELY    Mini-CogTM Copyright S [employer]. Licensed by the author for use in [facility] [employer]; reprinted with permission (susan@[email or web address]). All rights reserved.      Do you have sleep apnea, excessive snoring or daytime drowsiness?: no    Social History     Tobacco Use     Smoking status: Never Smoker     Smokeless tobacco: Never Used   Substance Use Topics     Alcohol use: No     Alcohol/week: 0.0 standard drinks     If you drink alcohol do you typically have >3 drinks per day or >7 drinks per week? No    Alcohol Use 3/7/2022   Prescreen: >3 drinks/day or >7 drinks/week? Not Applicable   Prescreen: >3 drinks/day or >7 drinks/week? -   No flowsheet data found.    Current providers sharing in care for this patient include:   Patient Care Team:  Danie Galvan MD as PCP - General  Danie Galvan MD as Assigned PCP  No specialists.     The following health maintenance items are reviewed in Epic and correct as of today:  Health Maintenance Due   Topic Date Due     ANNUAL REVIEW OF HM ORDERS  Never done     ADVANCE CARE PLANNING  2018     FALL RISK ASSESSMENT  2022       Patient Active Problem List    Diagnosis Date Noted     HYPERLIPIDEMIA LDL GOAL <130 10/31/2010     Priority: Medium     Hypertension goal BP (blood pressure) < 140/90 2006      "Priority: Medium     Genital herpes      Priority: Medium       History   Smoking Status     Never Smoker   Smokeless Tobacco     Never Used       Family history:  CV disease: no  Breast cancer: no  Colon cancer: no  Ovarian/uterine/cervical cancer: no    Multivitamin or Vit D use: sometimes MVI    Vaccines:current    Pregnancy history: G-0, P-0  Contraception: na    Lab Results   Component Value Date    PAP NIL 11/30/2009     Past colon cancer screen:2012  Past mammogram:August, 2021    Review of Systems   Constitutional: Negative for chills and fever.   HENT: Positive for hearing loss. Negative for congestion, ear pain and sore throat.    Eyes: Negative for pain and visual disturbance.   Respiratory: Positive for cough. Negative for shortness of breath.    Cardiovascular: Positive for chest pain. Negative for palpitations and peripheral edema.   Gastrointestinal: Positive for abdominal pain and nausea. Negative for constipation, diarrhea, heartburn and hematochezia.   Breasts:  Negative for tenderness, breast mass and discharge.   Genitourinary: Negative for dysuria, frequency, genital sores, hematuria, pelvic pain, urgency, vaginal bleeding and vaginal discharge.   Musculoskeletal: Positive for myalgias. Negative for arthralgias and joint swelling.   Skin: Positive for rash.   Neurological: Positive for paresthesias. Negative for dizziness, weakness and headaches.   Psychiatric/Behavioral: Negative for mood changes. The patient is not nervous/anxious.    Rib sore anterior chest at sternum.   Sometimes pain RUQ a stabbing pain.  Triggering factors:?  Occasional.    OBJECTIVE:                                                    OBJECTIVE:Blood pressure (!) 142/90, pulse 74, temperature 97.6  F (36.4  C), temperature source Tympanic, resp. rate 16, height 1.664 m (5' 5.5\"), weight 76.2 kg (168 lb), not currently breastfeeding. BMI=Body mass index is 27.53 kg/m .  GENERAL APPEARANCE ADULT: Alert, no acute " distress  EYES: PERRL, EOM normal, conjunctiva and lids normal  HENT: Ears and TMs normal, oral mucosa and posterior oropharynx normal  NECK: No adenopathy,masses or thyromegaly  RESP: lungs clear to auscultation   CV: normal rate, regular rhythm, no murmur or gallop  ABDOMEN: soft, no organomegaly, masses or tenderness  MS: extremities normal, no peripheral edema  Abnormal monofilament left foot in distal foot-irregular.  OK on right foot.   SKIN: Many seborrheic keratosis on back.       ASSESSMENT/PLAN:                                                    Lifestyle recommendations:regular exercise, at least 150 minutes per week (average 30 minutes 5 times a week)  weight loss recommended (ideal BMI-body mass index is <25)  The following exams/tests were recommended and discussed for health maintenance:  Colon cancer screening options most commonly used are: 1. Colonoscopy (colon scope) and 2. FIT (stool test).  FIT testing to check for hidden blood in the stool is a colon cancer screening test which should be done once yearly if normal.  If abnormal, a colonoscopy is recommended.    When to stop colon cancer screening?  Experts agree that colon cancer screening is generally not recommended when life expectancy is <10 years and not at age over 85.  The Expert group USPSTF recommends against screening for ages 76 and older.  It is reasonable to stop screening for colon cancer sometime between age 76 and 85 for most individuals.   Mammogram screening recommendations differ among expert groups.  They may be started at age 40 or 50.  Screening can be yearly or every other year depending upon a person's risk and preference.   When to stop breast cancer screening/mammograms?  Many expert groups recommend screening only up to age 74.  Some experts advocate screening up until estimated 5-10 years anticipated remaining lifespan.     (Z00.00) Encounter for subsequent annual wellness visit in Medicare patient  (primary  "encounter diagnosis)    (E78.5) Hyperlipidemia LDL goal <130  Comment: On atorvastatin 5mg daily  Plan: Lipid panel reflex to direct LDL Fasting,         atorvastatin (LIPITOR) 10 MG tablet        Fasting blood tests today.  REfills.     (I10) Hypertension goal BP (blood pressure) < 140/90  Comment: mostly OK, high at times  Plan: Comprehensive metabolic panel (BMP + Alb, Alk         Phos, ALT, AST, Total. Bili, TP)        You can try a higher dose of amlodipine-5mg daily.  Take two of the 2.5mg pills daily.  IF working OK, the next prescription will be for 5mg pills.     (R20.0,  R20.2) Numbness and tingling of both feet  Comment: possible peripheral neuropathy.   Plan: TSH with free T4 reflex, Vitamin B12        Check for thyroid problem and  B12 deficiency.      Skin spots are seborrheic keratosis (age spots) not worrisome.     Patient has been advised of split billing requirements and indicates understanding: Yes    COUNSELING:  Reviewed preventive health counseling, as reflected in patient instructions  Special attention given to:       Regular exercise       Healthy diet/nutrition       Colon cancer screening    Estimated body mass index is 27.53 kg/m  as calculated from the following:    Height as of this encounter: 1.664 m (5' 5.5\").    Weight as of this encounter: 76.2 kg (168 lb).        She reports that she has never smoked. She has never used smokeless tobacco.      Appropriate preventive services were discussed with this patient, including applicable screening as appropriate for cardiovascular disease, diabetes, osteopenia/osteoporosis, and glaucoma.  As appropriate for age/gender, discussed screening for colorectal cancer, prostate cancer, breast cancer, and cervical cancer. Checklist reviewing preventive services available has been given to the patient.    Reviewed patients plan of care and provided an AVS. The Basic Care Plan (routine screening as documented in Health Maintenance) for Sofia meets " the Care Plan requirement. This Care Plan has been established and reviewed with the Patient.    Counseling Resources:  ATP IV Guidelines  Pooled Cohorts Equation Calculator  Breast Cancer Risk Calculator  Breast Cancer: Medication to Reduce Risk  FRAX Risk Assessment  ICSI Preventive Guidelines  Dietary Guidelines for Americans, 2010  USDA's MyPlate  ASA Prophylaxis  Lung CA Screening    Danie Galvan MD  [facility]    Identified Health Risks:

## 2022-03-10 NOTE — NURSING NOTE
"Chief Complaint   Patient presents with     Physical       Initial BP (!) 142/90   Pulse 74   Temp 97.6  F (36.4  C) (Tympanic)   Resp 16   Ht 1.664 m (5' 5.5\")   Wt 76.2 kg (168 lb)   BMI 27.53 kg/m   Estimated body mass index is 27.53 kg/m  as calculated from the following:    Height as of this encounter: 1.664 m (5' 5.5\").    Weight as of this encounter: 76.2 kg (168 lb).    Patient presents to the clinic using     Health Maintenance that is potentially due pending provider review:          Is there anyone who you would like to be able to receive your results?   If yes have patient fill out DONIS    "

## 2022-03-10 NOTE — PATIENT INSTRUCTIONS
ASSESSMENT/PLAN:                                                    Lifestyle recommendations:regular exercise, at least 150 minutes per week (average 30 minutes 5 times a week)  weight loss recommended (ideal BMI-body mass index is <25)  The following exams/tests were recommended and discussed for health maintenance:  Colon cancer screening options most commonly used are: 1. Colonoscopy (colon scope) and 2. FIT (stool test).  FIT testing to check for hidden blood in the stool is a colon cancer screening test which should be done once yearly if normal.  If abnormal, a colonoscopy is recommended.    When to stop colon cancer screening?  Experts agree that colon cancer screening is generally not recommended when life expectancy is <10 years and not at age over 85.  The Expert group USPSTF recommends against screening for ages 76 and older.  It is reasonable to stop screening for colon cancer sometime between age 76 and 85 for most individuals.   Mammogram screening recommendations differ among expert groups.  They may be started at age 40 or 50.  Screening can be yearly or every other year depending upon a person's risk and preference.   When to stop breast cancer screening/mammograms?  Many expert groups recommend screening only up to age 74.  Some experts advocate screening up until estimated 5-10 years anticipated remaining lifespan.     (Z00.00) Encounter for subsequent annual wellness visit in Medicare patient  (primary encounter diagnosis)    (E78.5) Hyperlipidemia LDL goal <130  Comment: On atorvastatin 5mg daily  Plan: Lipid panel reflex to direct LDL Fasting,         atorvastatin (LIPITOR) 10 MG tablet        Fasting blood tests today.  REfills.     (I10) Hypertension goal BP (blood pressure) < 140/90  Comment: mostly OK, high at times  Plan: Comprehensive metabolic panel (BMP + Alb, Alk         Phos, ALT, AST, Total. Bili, TP)        You can try a higher dose of amlodipine-5mg daily.  Take two of the 2.5mg  pills daily.  IF working OK, the next prescription will be for 5mg pills.     (R20.0,  R20.2) Numbness and tingling of both feet  Comment: possible peripheral neuropathy.   Plan: TSH with free T4 reflex, Vitamin B12        Check for thyroid problem and  B12 deficiency.      Skin spots are seborrheic keratosis (age spots) not worrisome.

## 2022-03-13 DIAGNOSIS — E80.6 HYPERBILIRUBINEMIA: Primary | ICD-10-CM

## 2022-03-13 NOTE — RESULT ENCOUNTER NOTE
Azael Black,  Your Vit B12 level is normal.   TSH is normal indicating that the level of thyroid hormone is in the normal range.  The blood chemistries (Basic metabolic panel) are all normal including electrolytes (salt balances in the blood), blood glucose and kidney tests.    One test is abnormal the bilirubin.  This can sometimes be from liver and sometimes from a blood problem.   PLAN: Schedule an appointment with our  for a lab appointment for a few additional tests to help me sort out the cause of elevated bilirubin.   If all is OK, I may have you repeat the bilirubin test in a couple months.   TERESA GANDHI MD

## 2022-03-16 ENCOUNTER — ALLIED HEALTH/NURSE VISIT (OUTPATIENT)
Dept: FAMILY MEDICINE | Facility: CLINIC | Age: 77
End: 2022-03-16

## 2022-03-16 ENCOUNTER — TELEPHONE (OUTPATIENT)
Dept: FAMILY MEDICINE | Facility: CLINIC | Age: 77
End: 2022-03-16

## 2022-03-16 ENCOUNTER — LAB (OUTPATIENT)
Dept: LAB | Facility: CLINIC | Age: 77
End: 2022-03-16
Payer: COMMERCIAL

## 2022-03-16 VITALS — DIASTOLIC BLOOD PRESSURE: 88 MMHG | SYSTOLIC BLOOD PRESSURE: 130 MMHG

## 2022-03-16 DIAGNOSIS — I10 HYPERTENSION GOAL BP (BLOOD PRESSURE) < 140/90: Primary | ICD-10-CM

## 2022-03-16 DIAGNOSIS — E80.6 HYPERBILIRUBINEMIA: ICD-10-CM

## 2022-03-16 LAB
BASOPHILS # BLD AUTO: 0 10E3/UL (ref 0–0.2)
BASOPHILS NFR BLD AUTO: 1 %
EOSINOPHIL # BLD AUTO: 0.1 10E3/UL (ref 0–0.7)
EOSINOPHIL NFR BLD AUTO: 1 %
ERYTHROCYTE [DISTWIDTH] IN BLOOD BY AUTOMATED COUNT: 12.9 % (ref 10–15)
HCT VFR BLD AUTO: 45.9 % (ref 35–47)
HGB BLD-MCNC: 15 G/DL (ref 11.7–15.7)
LYMPHOCYTES # BLD AUTO: 1.6 10E3/UL (ref 0.8–5.3)
LYMPHOCYTES NFR BLD AUTO: 19 %
MCH RBC QN AUTO: 30.2 PG (ref 26.5–33)
MCHC RBC AUTO-ENTMCNC: 32.7 G/DL (ref 31.5–36.5)
MCV RBC AUTO: 92 FL (ref 78–100)
MONOCYTES # BLD AUTO: 0.9 10E3/UL (ref 0–1.3)
MONOCYTES NFR BLD AUTO: 10 %
NEUTROPHILS # BLD AUTO: 6.1 10E3/UL (ref 1.6–8.3)
NEUTROPHILS NFR BLD AUTO: 69 %
PLATELET # BLD AUTO: 284 10E3/UL (ref 150–450)
RBC # BLD AUTO: 4.97 10E6/UL (ref 3.8–5.2)
RETICS # AUTO: 0.06 10E6/UL (ref 0.03–0.1)
RETICS/RBC NFR AUTO: 1.2 % (ref 0.5–2)
WBC # BLD AUTO: 8.8 10E3/UL (ref 4–11)

## 2022-03-16 PROCEDURE — 83010 ASSAY OF HAPTOGLOBIN QUANT: CPT

## 2022-03-16 PROCEDURE — 85045 AUTOMATED RETICULOCYTE COUNT: CPT

## 2022-03-16 PROCEDURE — 36415 COLL VENOUS BLD VENIPUNCTURE: CPT

## 2022-03-16 PROCEDURE — 85025 COMPLETE CBC W/AUTO DIFF WBC: CPT

## 2022-03-16 PROCEDURE — 85060 BLOOD SMEAR INTERPRETATION: CPT | Performed by: PATHOLOGY

## 2022-03-16 PROCEDURE — 99207 PR NO CHARGE NURSE ONLY: CPT | Performed by: FAMILY MEDICINE

## 2022-03-16 NOTE — TELEPHONE ENCOUNTER
"Sofia gave the  a list of blood pressure readings. I will give to Dr. Galvan to review. She states 03/12/22 started double dose of 1/2 tab to reach 2.5 mg. \"so far no side effects\".  She is asking should she still start the 5 mg dose, asap or after test results she had today. She has written on a note side effects concern me because I have no back up in the house.   03/14/2022   137/98  P> 109                       140/104      112                       123/82          86    03/15/2022    140/87        82                         154/84       75                         124/84        93      03/16/2022      130/95      102                          148/98       90                          130/88       97            "

## 2022-03-16 NOTE — TELEPHONE ENCOUNTER
Please call.  11/18 readings at or above 140/90  Still too high.   PLAN: Increase the amlodipine to 5mg daily.  This is a medium strength dose and I don't expect any problems that would be serious or incapacitating in any way.   TERESA GANDHI MD

## 2022-03-17 LAB
HAPTOGLOB SERPL-MCNC: 132 MG/DL (ref 32–197)
PATH REPORT.COMMENTS IMP SPEC: NORMAL
PATH REPORT.COMMENTS IMP SPEC: NORMAL
PATH REPORT.FINAL DX SPEC: NORMAL
PATH REPORT.MICROSCOPIC SPEC OTHER STN: NORMAL
PATH REPORT.MICROSCOPIC SPEC OTHER STN: NORMAL

## 2022-03-18 NOTE — RESULT ENCOUNTER NOTE
Please arrange for orders.   Future hepatic panel in 1 month.   Thanks.     Azael Black,   Your bilirubin test was a little elevated.  No other signs of liver disease.    Your blood tests are normal and did not suggest any problems with blood cells breaking up.    PLAN: I don't  think this is worrisome but I do recommend a follow-up bilirubin in 1-2 months to see if it remains the jimmie or changes.   TERESA GANDHI MD

## 2022-09-26 ENCOUNTER — IMMUNIZATION (OUTPATIENT)
Dept: FAMILY MEDICINE | Facility: CLINIC | Age: 77
End: 2022-09-26
Payer: COMMERCIAL

## 2022-09-26 DIAGNOSIS — Z23 HIGH PRIORITY FOR 2019-NCOV VACCINE: Primary | ICD-10-CM

## 2022-09-26 PROCEDURE — 90662 IIV NO PRSV INCREASED AG IM: CPT

## 2022-09-26 PROCEDURE — 0124A COVID-19,PF,PFIZER BOOSTER BIVALENT: CPT

## 2022-09-26 PROCEDURE — 91312 COVID-19,PF,PFIZER BOOSTER BIVALENT: CPT

## 2022-09-26 PROCEDURE — G0008 ADMIN INFLUENZA VIRUS VAC: HCPCS | Mod: 59

## 2022-09-28 ENCOUNTER — ANCILLARY PROCEDURE (OUTPATIENT)
Dept: MAMMOGRAPHY | Facility: CLINIC | Age: 77
End: 2022-09-28
Attending: FAMILY MEDICINE
Payer: COMMERCIAL

## 2022-09-28 DIAGNOSIS — Z12.31 VISIT FOR SCREENING MAMMOGRAM: ICD-10-CM

## 2022-09-28 PROCEDURE — 77067 SCR MAMMO BI INCL CAD: CPT | Mod: TC | Performed by: RADIOLOGY

## 2022-09-28 PROCEDURE — 77063 BREAST TOMOSYNTHESIS BI: CPT | Mod: TC | Performed by: RADIOLOGY

## 2023-01-06 ENCOUNTER — APPOINTMENT (OUTPATIENT)
Dept: GENERAL RADIOLOGY | Facility: CLINIC | Age: 78
End: 2023-01-06
Attending: EMERGENCY MEDICINE
Payer: COMMERCIAL

## 2023-01-06 ENCOUNTER — TELEPHONE (OUTPATIENT)
Dept: FAMILY MEDICINE | Facility: CLINIC | Age: 78
End: 2023-01-06

## 2023-01-06 ENCOUNTER — HOSPITAL ENCOUNTER (EMERGENCY)
Facility: CLINIC | Age: 78
Discharge: HOME | End: 2023-01-06
Attending: EMERGENCY MEDICINE | Admitting: EMERGENCY MEDICINE
Payer: COMMERCIAL

## 2023-01-06 VITALS
HEIGHT: 67 IN | RESPIRATION RATE: 18 BRPM | HEART RATE: 82 BPM | SYSTOLIC BLOOD PRESSURE: 134 MMHG | BODY MASS INDEX: 24.96 KG/M2 | DIASTOLIC BLOOD PRESSURE: 86 MMHG | WEIGHT: 159 LBS | TEMPERATURE: 98.1 F | OXYGEN SATURATION: 98 %

## 2023-01-06 DIAGNOSIS — N30.90 BLADDER INFECTION: ICD-10-CM

## 2023-01-06 DIAGNOSIS — I10 HYPERTENSION, UNSPECIFIED TYPE: ICD-10-CM

## 2023-01-06 DIAGNOSIS — R07.9 CHEST PAIN, UNSPECIFIED TYPE: ICD-10-CM

## 2023-01-06 LAB
ALBUMIN SERPL BCG-MCNC: 4.4 G/DL (ref 3.5–5.2)
ALBUMIN UR-MCNC: NEGATIVE MG/DL
ALP SERPL-CCNC: 68 U/L (ref 35–104)
ALT SERPL W P-5'-P-CCNC: 19 U/L (ref 10–35)
ANION GAP SERPL CALCULATED.3IONS-SCNC: 10 MMOL/L (ref 7–15)
APPEARANCE UR: ABNORMAL
AST SERPL W P-5'-P-CCNC: 24 U/L (ref 10–35)
BACTERIA #/AREA URNS HPF: ABNORMAL /HPF
BASOPHILS # BLD AUTO: 0.1 10E3/UL (ref 0–0.2)
BASOPHILS NFR BLD AUTO: 1 %
BILIRUB SERPL-MCNC: 1.6 MG/DL
BILIRUB UR QL STRIP: NEGATIVE
BUN SERPL-MCNC: 14.3 MG/DL (ref 8–23)
CALCIUM SERPL-MCNC: 10.1 MG/DL (ref 8.8–10.2)
CHLORIDE SERPL-SCNC: 98 MMOL/L (ref 98–107)
COLOR UR AUTO: YELLOW
CREAT SERPL-MCNC: 0.73 MG/DL (ref 0.51–0.95)
DEPRECATED HCO3 PLAS-SCNC: 28 MMOL/L (ref 22–29)
EOSINOPHIL # BLD AUTO: 0 10E3/UL (ref 0–0.7)
EOSINOPHIL NFR BLD AUTO: 0 %
ERYTHROCYTE [DISTWIDTH] IN BLOOD BY AUTOMATED COUNT: 13.1 % (ref 10–15)
GFR SERPL CREATININE-BSD FRML MDRD: 83 ML/MIN/1.73M2
GLUCOSE SERPL-MCNC: 95 MG/DL (ref 70–99)
GLUCOSE UR STRIP-MCNC: NEGATIVE MG/DL
HCT VFR BLD AUTO: 41.6 % (ref 35–47)
HGB BLD-MCNC: 14 G/DL (ref 11.7–15.7)
HGB UR QL STRIP: NEGATIVE
HOLD SPECIMEN: NORMAL
HOLD SPECIMEN: NORMAL
IMM GRANULOCYTES # BLD: 0 10E3/UL
IMM GRANULOCYTES NFR BLD: 0 %
KETONES UR STRIP-MCNC: 5 MG/DL
LEUKOCYTE ESTERASE UR QL STRIP: ABNORMAL
LYMPHOCYTES # BLD AUTO: 2 10E3/UL (ref 0.8–5.3)
LYMPHOCYTES NFR BLD AUTO: 19 %
MCH RBC QN AUTO: 30.2 PG (ref 26.5–33)
MCHC RBC AUTO-ENTMCNC: 33.7 G/DL (ref 31.5–36.5)
MCV RBC AUTO: 90 FL (ref 78–100)
MONOCYTES # BLD AUTO: 0.7 10E3/UL (ref 0–1.3)
MONOCYTES NFR BLD AUTO: 7 %
MUCOUS THREADS #/AREA URNS LPF: PRESENT /LPF
NEUTROPHILS # BLD AUTO: 7.6 10E3/UL (ref 1.6–8.3)
NEUTROPHILS NFR BLD AUTO: 73 %
NITRATE UR QL: NEGATIVE
NRBC # BLD AUTO: 0 10E3/UL
NRBC BLD AUTO-RTO: 0 /100
PH UR STRIP: 6 [PH] (ref 5–7)
PLATELET # BLD AUTO: 267 10E3/UL (ref 150–450)
POTASSIUM SERPL-SCNC: 3.4 MMOL/L (ref 3.4–5.3)
PROT SERPL-MCNC: 7.5 G/DL (ref 6.4–8.3)
RBC # BLD AUTO: 4.63 10E6/UL (ref 3.8–5.2)
RBC URINE: 1 /HPF
SODIUM SERPL-SCNC: 136 MMOL/L (ref 136–145)
SP GR UR STRIP: 1.01 (ref 1–1.03)
SQUAMOUS EPITHELIAL: 1 /HPF
TROPONIN T SERPL HS-MCNC: 10 NG/L
TROPONIN T SERPL HS-MCNC: 9 NG/L
UROBILINOGEN UR STRIP-MCNC: NORMAL MG/DL
WBC # BLD AUTO: 10.6 10E3/UL (ref 4–11)
WBC URINE: 55 /HPF

## 2023-01-06 PROCEDURE — 85025 COMPLETE CBC W/AUTO DIFF WBC: CPT | Performed by: EMERGENCY MEDICINE

## 2023-01-06 PROCEDURE — 81001 URINALYSIS AUTO W/SCOPE: CPT | Performed by: EMERGENCY MEDICINE

## 2023-01-06 PROCEDURE — 71046 X-RAY EXAM CHEST 2 VIEWS: CPT

## 2023-01-06 PROCEDURE — 93005 ELECTROCARDIOGRAM TRACING: CPT | Performed by: EMERGENCY MEDICINE

## 2023-01-06 PROCEDURE — 93010 ELECTROCARDIOGRAM REPORT: CPT | Performed by: EMERGENCY MEDICINE

## 2023-01-06 PROCEDURE — 99285 EMERGENCY DEPT VISIT HI MDM: CPT | Mod: 25 | Performed by: EMERGENCY MEDICINE

## 2023-01-06 PROCEDURE — 36415 COLL VENOUS BLD VENIPUNCTURE: CPT | Performed by: EMERGENCY MEDICINE

## 2023-01-06 PROCEDURE — 99284 EMERGENCY DEPT VISIT MOD MDM: CPT | Mod: 25 | Performed by: EMERGENCY MEDICINE

## 2023-01-06 PROCEDURE — 80053 COMPREHEN METABOLIC PANEL: CPT | Performed by: EMERGENCY MEDICINE

## 2023-01-06 PROCEDURE — 84484 ASSAY OF TROPONIN QUANT: CPT | Performed by: EMERGENCY MEDICINE

## 2023-01-06 PROCEDURE — 96374 THER/PROPH/DIAG INJ IV PUSH: CPT | Performed by: EMERGENCY MEDICINE

## 2023-01-06 PROCEDURE — 250N000011 HC RX IP 250 OP 636: Performed by: EMERGENCY MEDICINE

## 2023-01-06 PROCEDURE — 87086 URINE CULTURE/COLONY COUNT: CPT | Performed by: EMERGENCY MEDICINE

## 2023-01-06 RX ORDER — KETOROLAC TROMETHAMINE 15 MG/ML
15 INJECTION, SOLUTION INTRAMUSCULAR; INTRAVENOUS ONCE
Status: COMPLETED | OUTPATIENT
Start: 2023-01-06 | End: 2023-01-06

## 2023-01-06 RX ORDER — CIPROFLOXACIN 500 MG/1
500 TABLET, FILM COATED ORAL 2 TIMES DAILY
Qty: 14 TABLET | Refills: 0 | Status: SHIPPED | OUTPATIENT
Start: 2023-01-06 | End: 2023-01-18

## 2023-01-06 RX ADMIN — KETOROLAC TROMETHAMINE 15 MG: 15 INJECTION, SOLUTION INTRAMUSCULAR; INTRAVENOUS at 18:02

## 2023-01-06 ASSESSMENT — ACTIVITIES OF DAILY LIVING (ADL)
ADLS_ACUITY_SCORE: 35
ADLS_ACUITY_SCORE: 35

## 2023-01-06 NOTE — ED PROVIDER NOTES
History     Chief Complaint   Patient presents with     Hypertension     Sent from [facility] for high blood pressure- Called for an appointment and told to come here. Patient is on BP meds X2. Patient has had on/off chest pain that feels like it is in her ribs on the left side.      HPI  Sofia Freeman is a 77 year old female with past medical history significant for hypertension hyperlipidemia migraines who presents emergency department complaining of elevated blood pressure.  Patient states she has noticed over the past few days she felt a little dizzy earlier in the week and checked her blood pressure was high and has been checking since then it is been high and many of the times.  She contacted her primary care physician and he she was told to come in for further evaluation and care at this time she has no significant complaints denies any dizziness but is complaining of some left-sided chest pain which is reproducible with palpitation.  States no significant trauma but has been doing a lot of shoveling.  Denies any recent illness with significant coughing has not had fever chills denies any shortness of breath has not had any nausea vomiting diarrhea or abdominal pain denies any back pain has not any focal numbness weakness any extremity denies any bowel or bladder function.  He has not had any leg swelling denies any calf pain has not had a rash.  Rates her pain a 3 out of 10.    Allergies:  Allergies   Allergen Reactions     Nkda [No Known Drug Allergies]        Problem List:    Patient Active Problem List    Diagnosis Date Noted     HYPERLIPIDEMIA LDL GOAL <130 10/31/2010     Priority: Medium     Hypertension goal BP (blood pressure) < 140/90 06/19/2006     Priority: Medium     Genital herpes      Priority: Medium        Past Medical History:    Past Medical History:   Diagnosis Date     Cataract 8/17/2011     MIGRAINE NOS W/O MENTN INTRACTABLE        Past Surgical History:    Past Surgical History:    Procedure Laterality Date     APPENDECTOMY OPEN CHILD  1965    Appendectomy     COLONOSCOPY  2005    colonoscopy-normal repeat in 10 years     ESOPHAGOSCOPY, GASTROSCOPY, DUODENOSCOPY (EGD), COMBINED  2012    Procedure: COMBINED ESOPHAGOSCOPY, GASTROSCOPY, DUODENOSCOPY (EGD);  Gastroscopy;  Surgeon: Dorothy Hill MD;  Location: [facility]     PHACOEMULSIFICATION WITH STANDARD INTRAOCULAR LENS IMPLANT  2011    Procedure:PHACOEMULSIFICATION WITH STANDARD INTRAOCULAR LENS IMPLANT; Surgeon:JAY URBINA; Location:[facility]     PHACOEMULSIFICATION WITH STANDARD INTRAOCULAR LENS IMPLANT  2011    Procedure:PHACOEMULSIFICATION WITH STANDARD INTRAOCULAR LENS IMPLANT; Left Phacoemulsification with standard intraocular lens implant; Surgeon:JAY URBINA; Location:[facility]       Family History:    Family History   Problem Relation Age of Onset     Hypertension Mother      Arthritis Mother      Eye Disorder Mother         glaucoma     Heart Disease Father         ?     Alzheimer Disease Father          from dementia     Hypertension Father      Cerebrovascular Disease Maternal Grandmother      Alzheimer Disease Maternal Grandmother      Diabetes Maternal Grandmother      Alcohol/Drug Maternal Grandfather      Eye Disorder Sister         glaucoma     Arthritis Sister      Eye Disorder Sister         glaucoma     Hypertension Sister      Cancer Sister         Pancreatic      Breast Cancer No family hx of      Colon Cancer No family hx of        Social History:  Marital Status:   [5]  Social History     Tobacco Use     Smoking status: Never     Smokeless tobacco: Never   Substance Use Topics     Alcohol use: No     Alcohol/week: 0.0 standard drinks     Drug use: No        Medications:    amLODIPine (NORVASC) 5 MG tablet  aspirin 81 MG tablet  atorvastatin (LIPITOR) 10 MG tablet  hydrochlorothiazide (HYDRODIURIL) 12.5 MG tablet  THERAPEUTIC MULTIVIT/MINERAL OR TABS          Review of  "Systems  All systems reviewed and other than pertinent positives and negatives in HPI all other systems are negative.  Physical Exam   BP: (!) 158/96  Pulse: 95  Temp: 98.1  F (36.7  C)  Resp: 16  Height: 170.2 cm (5' 7\")  Weight: 72.1 kg (159 lb)  SpO2: 97 %      Physical Exam  Vitals and nursing note reviewed.   Constitutional:       General: She is not in acute distress.     Appearance: Normal appearance. She is not ill-appearing, toxic-appearing or diaphoretic.   HENT:      Head: Normocephalic and atraumatic.      Nose: Nose normal.      Mouth/Throat:      Mouth: Mucous membranes are moist.      Pharynx: Oropharynx is clear.   Eyes:      Conjunctiva/sclera: Conjunctivae normal.   Cardiovascular:      Rate and Rhythm: Normal rate and regular rhythm.      Pulses: Normal pulses.      Heart sounds: Normal heart sounds. No murmur heard.  Pulmonary:      Effort: Pulmonary effort is normal.      Breath sounds: Normal breath sounds. No stridor. No wheezing or rhonchi.      Comments: Mild tenderness palpation of the left midline anterior chest wall around ribs 7 or 8.  No erythema edema no significant swelling or crepitance there is tenderness palpation in this region.  Abdominal:      General: Abdomen is flat. Bowel sounds are normal. There is no distension.      Palpations: Abdomen is soft.      Tenderness: There is no abdominal tenderness. There is no right CVA tenderness or left CVA tenderness.   Musculoskeletal:         General: No tenderness. Normal range of motion.      Cervical back: Normal range of motion and neck supple.      Right lower leg: No edema.      Left lower leg: No edema.   Skin:     General: Skin is warm and dry.      Capillary Refill: Capillary refill takes less than 2 seconds.      Findings: No rash.   Neurological:      General: No focal deficit present.      Mental Status: She is alert and oriented to person, place, and time.      Sensory: No sensory deficit.      Coordination: Coordination " normal.   Psychiatric:         Mood and Affect: Mood normal.         ED Course                 Procedures              EKG Interpretation:      Interpreted by Pardeep Doll MD  Rhythm: normal sinus   Rate: Normal  Axis: Normal  Ectopy: none  Conduction: normal  ST Segments/ T Waves: Non-specific ST-T wave changes  Q Waves: none  Comparison to prior: No significant change from 10/8/2019.    Clinical Impression: Normal sinus rhythm with nonspecific ST-T wave changes.      Critical Care time:  none               Results for orders placed or performed during the hospital encounter of 01/06/23 (from the past 24 hour(s))   [place] Draw    Narrative    The following orders were created for panel order [place] Draw.  Procedure                               Abnormality         Status                     ---------                               -----------         ------                     Extra Blue Top Tube[[id number]]                              Final result               Extra Red Top Tube[[id number]]                               Final result               Extra Green Top (Lithium...[[id number]]                                                 Extra Purple Top Tube[[id number]]                                                         Please view results for these tests on the individual orders.   CBC with platelets, differential    Narrative    The following orders were created for panel order CBC with platelets, differential.  Procedure                               Abnormality         Status                     ---------                               -----------         ------                     CBC with platelets and d...[[id number]]                      Final result                 Please view results for these tests on the individual orders.   Comprehensive metabolic panel   Result Value Ref Range    Sodium 136 136 - 145 mmol/L    Potassium 3.4 3.4 - 5.3 mmol/L    Chloride 98 98 - 107 mmol/L    Carbon Dioxide (CO2)  28 22 - 29 mmol/L    Anion Gap 10 7 - 15 mmol/L    Urea Nitrogen 14.3 8.0 - 23.0 mg/dL    Creatinine 0.73 0.51 - 0.95 mg/dL    Calcium 10.1 8.8 - 10.2 mg/dL    Glucose 95 70 - 99 mg/dL    Alkaline Phosphatase 68 35 - 104 U/L    AST 24 10 - 35 U/L    ALT 19 10 - 35 U/L    Protein Total 7.5 6.4 - 8.3 g/dL    Albumin 4.4 3.5 - 5.2 g/dL    Bilirubin Total 1.6 (H) <=1.2 mg/dL    GFR Estimate 83 >60 mL/min/1.73m2   Troponin T, High Sensitivity   Result Value Ref Range    Troponin T, High Sensitivity 9 <=14 ng/L   Extra Blue Top Tube   Result Value Ref Range    Hold Specimen JIC    Extra Red Top Tube   Result Value Ref Range    Hold Specimen JIC    CBC with platelets and differential   Result Value Ref Range    WBC Count 10.6 4.0 - 11.0 10e3/uL    RBC Count 4.63 3.80 - 5.20 10e6/uL    Hemoglobin 14.0 11.7 - 15.7 g/dL    Hematocrit 41.6 35.0 - 47.0 %    MCV 90 78 - 100 fL    MCH 30.2 26.5 - 33.0 pg    MCHC 33.7 31.5 - 36.5 g/dL    RDW 13.1 10.0 - 15.0 %    Platelet Count 267 150 - 450 10e3/uL    % Neutrophils 73 %    % Lymphocytes 19 %    % Monocytes 7 %    % Eosinophils 0 %    % Basophils 1 %    % Immature Granulocytes 0 %    NRBCs per 100 WBC 0 <1 /100    Absolute Neutrophils 7.6 1.6 - 8.3 10e3/uL    Absolute Lymphocytes 2.0 0.8 - 5.3 10e3/uL    Absolute Monocytes 0.7 0.0 - 1.3 10e3/uL    Absolute Eosinophils 0.0 0.0 - 0.7 10e3/uL    Absolute Basophils 0.1 0.0 - 0.2 10e3/uL    Absolute Immature Granulocytes 0.0 <=0.4 10e3/uL    Absolute NRBCs 0.0 10e3/uL       Medications   ketorolac (TORADOL) injection 15 mg (has no administration in time range)     Results for orders placed or performed during the hospital encounter of 01/06/23   Chest XR,  PA & LAT    Narrative    EXAM: XR CHEST 2 VIEWS  LOCATION: [facility]  DATE/TIME: 1/6/2023 6:29 PM    INDICATION: Left sided chest pain  COMPARISON: None.      Impression    IMPRESSION:     Cardiac silhouette is normal in size. Mild aortic tortuosity  without findings to suggest aortic enlargement.    Symmetric lung inflation. No interstitial or alveolar opacities. Normal lung vascularity.    Diaphragm curvature is normal. No pleural effusion.    Bone mineralization is within normal limits. There is a slight reciprocal curve of the lower thoracic spine. No acute fractures identified.       Assessments & Plan (with Medical Decision Making) records were reviewed.  Labs were obtained EKG was unchanged significantly from previous.  Patient was given Toradol for pain.  CBC was unremarkable.  Comprehensive metabolic panel significant for a bilirubin level 1.6 which is slightly elevated.  Previous bilirubin was 2.3.  Urinalysis with 5 ketones large leukocyte Estrace few bacteria 55 WBCs.  This is consistent with possible UTI.  Patient's initial troponin was 9.  Patient's symptoms were improved with Toradol.  Chest x-ray without acute abnormality.  Patient's blood pressure came down on its own.  A repeat troponin was obtained and was 10.  Patient's blood pressure is normalized and I do not think immediate intervention is warranted.  Chest pain I feel is more likely musculoskeletal in nature and not ACS.  I am going to treat the patient for a UTI and have her keep monitoring her blood pressures and follow-up closely with primary care.  She understands if worsening chest pain shortness of breath fevers vomiting headache abdominal pain vomiting diarrhea altered mental status focal numbness weakness in any extremity or other symptoms present she should return for recheck.  Patient is agreement with this plan     I have reviewed the nursing notes.    I have reviewed the findings, diagnosis, plan and need for follow up with the patient.       Discharge Medication List as of 1/6/2023  9:11 PM      START taking these medications    Details   ciprofloxacin (CIPRO) 500 MG tablet Take 1 tablet (500 mg) by mouth 2 times daily, Disp-14 tablet, R-0, [facility]             Final  diagnoses:   Hypertension, unspecified type   Bladder infection   Chest pain, unspecified type       1/6/2023   [facility] EMERGENCY DEPT     Pardeep Doll MD  01/07/23 1214       Pardeep Doll MD  01/07/23 1210

## 2023-01-06 NOTE — ED TRIAGE NOTES
Sent from [facility] for high blood pressure- Called for an appointment and told to come here. Patient is on BP meds X2. Patient has had on/off chest pain that feels like it is in her ribs on the left side. Pain is worse when touched. Denies any falls. Did have nausea and weakness on Wednesday.    Triage Assessment     Row Name 01/06/23 8434       Triage Assessment (Adult)    Airway WDL WDL       Respiratory WDL    Respiratory WDL WDL       Cognitive/Neuro/Behavioral WDL    Cognitive/Neuro/Behavioral WDL WDL

## 2023-01-06 NOTE — TELEPHONE ENCOUNTER
Patient calling to report spike in blood pressure today; her automatic wrist BP device read 168/106, .    Patient keeps daily logs of blood pressure and usually runs 120's/80's, HR 70-80; was sitting resting in couch watching TV (had already taken BP medications, hydrochlorothiazide and amlodipine) when noticing spike in BP.    Patient states asymptomatic at this time (denies dizziness, headache, vision changes, sob, cp, nausea,trouble with balance), however, patient noted on Wednesday evening having one episode of sudden dizziness and nausea.  Patient did not check her blood pressure that evening after the incident; just rested and went to sleep.    Patient also experienced brief pain in the back of her head this morning while looking at computer screen.    Patient took BP again while talking to this writer; automatic wrist read 175/113, .  Patient denies symptoms at this time; recommended same day appointment or emergency room if unable to be seen today.    Please advise and call patient; informed patient to go to emergency room and not wait for appointment today if experiencing any severe symptoms (dizziness, headache, vision changes, chest pain, shortness of breath, nausea,trouble with balance).    Patient verbalized agreement and understanding.  Noemi Gamez RN

## 2023-01-07 NOTE — DISCHARGE INSTRUCTIONS
Turn if symptoms worsen or new symptoms develop.  Continue to monitor blood pressure your blood pressure looks good here and I would not change anything but you should discuss this with your primary care you do have a UTI and this will be treated with Cipro hold multivitamins while taking the Cipro.  If increased bladder pain abdominal pain fevers chills nausea vomiting or other symptoms present please return for further evaluation and care.

## 2023-01-08 LAB — BACTERIA UR CULT: NORMAL

## 2023-01-18 ENCOUNTER — OFFICE VISIT (OUTPATIENT)
Dept: FAMILY MEDICINE | Facility: CLINIC | Age: 78
End: 2023-01-18
Payer: COMMERCIAL

## 2023-01-18 VITALS
RESPIRATION RATE: 16 BRPM | OXYGEN SATURATION: 98 % | DIASTOLIC BLOOD PRESSURE: 88 MMHG | HEIGHT: 67 IN | BODY MASS INDEX: 26.06 KG/M2 | HEART RATE: 80 BPM | WEIGHT: 166 LBS | TEMPERATURE: 97.6 F | SYSTOLIC BLOOD PRESSURE: 136 MMHG

## 2023-01-18 DIAGNOSIS — R07.89 CHEST WALL PAIN: ICD-10-CM

## 2023-01-18 DIAGNOSIS — I10 HYPERTENSION, UNSPECIFIED TYPE: Primary | ICD-10-CM

## 2023-01-18 PROCEDURE — 99213 OFFICE O/P EST LOW 20 MIN: CPT | Performed by: FAMILY MEDICINE

## 2023-01-18 ASSESSMENT — PAIN SCALES - GENERAL: PAINLEVEL: NO PAIN (0)

## 2023-01-18 NOTE — PROGRESS NOTES
"S :Sofia Freeman is a 77 year old female with a htn episode with some cp a couple weeks ago, went to ED.  Labs, ekg, trops fine.  Sx improved.  Possible UTI, given abx.    Feeling better.  htn now controlled.  No pain, no sob, no LE edema    O:/88   Pulse 80   Temp 97.6  F (36.4  C) (Tympanic)   Resp 16   Ht 1.702 m (5' 7\")   Wt 75.3 kg (166 lb)   SpO2 98%   BMI 26.00 kg/m    GEN: Alert and oriented, in no acute distress  CV: RRR, no murmur  RESP: lungs clear bilaterally, good effort  EXT: no edema or lesions noted in lower extremities    A; htn, controlled      Chest pain, likely musculoskeletal    P: went over results, reassured her.  F/u in 6 weeks for her yearly refills, labs    She agrees.         "

## 2023-03-09 ASSESSMENT — ENCOUNTER SYMPTOMS
JOINT SWELLING: 0
CONSTIPATION: 0
DIZZINESS: 0
ABDOMINAL PAIN: 1
DYSURIA: 0
FREQUENCY: 0
EYE PAIN: 0
COUGH: 1
PARESTHESIAS: 0
BREAST MASS: 0
SORE THROAT: 0
HEMATURIA: 0
DIARRHEA: 0
FEVER: 0
WEAKNESS: 0
MYALGIAS: 0
ARTHRALGIAS: 0
HEARTBURN: 0
NAUSEA: 0
HEMATOCHEZIA: 0
HEADACHES: 0
PALPITATIONS: 0
CHILLS: 0
SHORTNESS OF BREATH: 0
NERVOUS/ANXIOUS: 0

## 2023-03-09 ASSESSMENT — ACTIVITIES OF DAILY LIVING (ADL)
CURRENT_FUNCTION: NO ASSISTANCE NEEDED
CURRENT_FUNCTION: TRANSPORTATION REQUIRES ASSISTANCE

## 2023-03-13 ENCOUNTER — TELEPHONE (OUTPATIENT)
Dept: FAMILY MEDICINE | Facility: CLINIC | Age: 78
End: 2023-03-13

## 2023-03-13 ENCOUNTER — OFFICE VISIT (OUTPATIENT)
Dept: FAMILY MEDICINE | Facility: CLINIC | Age: 78
End: 2023-03-13
Payer: COMMERCIAL

## 2023-03-13 VITALS
RESPIRATION RATE: 16 BRPM | OXYGEN SATURATION: 98 % | HEIGHT: 67 IN | DIASTOLIC BLOOD PRESSURE: 88 MMHG | HEART RATE: 82 BPM | SYSTOLIC BLOOD PRESSURE: 138 MMHG | TEMPERATURE: 97.6 F | WEIGHT: 168 LBS | BODY MASS INDEX: 26.37 KG/M2

## 2023-03-13 DIAGNOSIS — I10 ESSENTIAL HYPERTENSION WITH GOAL BLOOD PRESSURE LESS THAN 140/90: ICD-10-CM

## 2023-03-13 DIAGNOSIS — E78.5 HYPERLIPIDEMIA LDL GOAL <130: ICD-10-CM

## 2023-03-13 DIAGNOSIS — R17 ELEVATED BILIRUBIN: ICD-10-CM

## 2023-03-13 DIAGNOSIS — Z00.00 MEDICARE ANNUAL WELLNESS VISIT, SUBSEQUENT: Primary | ICD-10-CM

## 2023-03-13 LAB
ALBUMIN SERPL BCG-MCNC: 4.5 G/DL (ref 3.5–5.2)
ALP SERPL-CCNC: 76 U/L (ref 35–104)
ALT SERPL W P-5'-P-CCNC: 20 U/L (ref 10–35)
ANION GAP SERPL CALCULATED.3IONS-SCNC: 9 MMOL/L (ref 7–15)
AST SERPL W P-5'-P-CCNC: 23 U/L (ref 10–35)
BILIRUB SERPL-MCNC: 2.1 MG/DL
BUN SERPL-MCNC: 17.6 MG/DL (ref 8–23)
CALCIUM SERPL-MCNC: 10.1 MG/DL (ref 8.8–10.2)
CHLORIDE SERPL-SCNC: 97 MMOL/L (ref 98–107)
CREAT SERPL-MCNC: 0.77 MG/DL (ref 0.51–0.95)
DEPRECATED HCO3 PLAS-SCNC: 30 MMOL/L (ref 22–29)
GFR SERPL CREATININE-BSD FRML MDRD: 78 ML/MIN/1.73M2
GLUCOSE SERPL-MCNC: 94 MG/DL (ref 70–99)
POTASSIUM SERPL-SCNC: 3.8 MMOL/L (ref 3.4–5.3)
PROT SERPL-MCNC: 7.9 G/DL (ref 6.4–8.3)
SODIUM SERPL-SCNC: 136 MMOL/L (ref 136–145)

## 2023-03-13 PROCEDURE — 90471 IMMUNIZATION ADMIN: CPT | Performed by: FAMILY MEDICINE

## 2023-03-13 PROCEDURE — 80053 COMPREHEN METABOLIC PANEL: CPT | Performed by: FAMILY MEDICINE

## 2023-03-13 PROCEDURE — 99214 OFFICE O/P EST MOD 30 MIN: CPT | Mod: 25 | Performed by: FAMILY MEDICINE

## 2023-03-13 PROCEDURE — 90715 TDAP VACCINE 7 YRS/> IM: CPT | Performed by: FAMILY MEDICINE

## 2023-03-13 PROCEDURE — 36415 COLL VENOUS BLD VENIPUNCTURE: CPT | Performed by: FAMILY MEDICINE

## 2023-03-13 PROCEDURE — G0439 PPPS, SUBSEQ VISIT: HCPCS | Performed by: FAMILY MEDICINE

## 2023-03-13 RX ORDER — ATORVASTATIN CALCIUM 10 MG/1
TABLET, FILM COATED ORAL
Qty: 45 TABLET | Refills: 3 | Status: SHIPPED | OUTPATIENT
Start: 2023-03-13 | End: 2024-04-22

## 2023-03-13 RX ORDER — HYDROCHLOROTHIAZIDE 12.5 MG/1
12.5 TABLET ORAL DAILY
Qty: 90 TABLET | Refills: 3 | Status: SHIPPED | OUTPATIENT
Start: 2023-03-13 | End: 2024-03-18

## 2023-03-13 RX ORDER — AMLODIPINE BESYLATE 5 MG/1
5 TABLET ORAL DAILY
Qty: 90 TABLET | Refills: 3 | Status: SHIPPED | OUTPATIENT
Start: 2023-03-13 | End: 2024-04-15

## 2023-03-13 RX ORDER — ATORVASTATIN CALCIUM 10 MG/1
10 TABLET, FILM COATED ORAL DAILY
Qty: 45 TABLET | Refills: 3 | Status: SHIPPED | OUTPATIENT
Start: 2023-03-13 | End: 2023-03-13

## 2023-03-13 ASSESSMENT — ENCOUNTER SYMPTOMS
CHILLS: 0
DIARRHEA: 0
FEVER: 0
SHORTNESS OF BREATH: 0
PARESTHESIAS: 0
DIZZINESS: 0
COUGH: 1
PALPITATIONS: 0
MYALGIAS: 0
ABDOMINAL PAIN: 1
NERVOUS/ANXIOUS: 0
FREQUENCY: 0
JOINT SWELLING: 0
BREAST MASS: 0
HEADACHES: 0
HEARTBURN: 0
WEAKNESS: 0
HEMATOCHEZIA: 0
DYSURIA: 0
CONSTIPATION: 0
ARTHRALGIAS: 0
NAUSEA: 0
HEMATURIA: 0
SORE THROAT: 0
EYE PAIN: 0

## 2023-03-13 ASSESSMENT — PAIN SCALES - GENERAL: PAINLEVEL: NO PAIN (0)

## 2023-03-13 ASSESSMENT — ACTIVITIES OF DAILY LIVING (ADL)
CURRENT_FUNCTION: TRANSPORTATION REQUIRES ASSISTANCE
CURRENT_FUNCTION: NO ASSISTANCE NEEDED

## 2023-03-13 NOTE — TELEPHONE ENCOUNTER
"Sig clarification please --- Rx came over with two sets of directions.  \"take 1 tablet (10mg) by mouth daily take 1/2 tab in evening\"      Qty 45  Days supply omitted      Thanks for your help,  Seble Akers, Pharm Tech  [facility]    "

## 2023-03-13 NOTE — PROGRESS NOTES
"SUBJECTIVE:   meryl is a 77 year old who presents for Preventive Visit.  Patient has been advised of split billing requirements and indicates understanding: Yes  Are you in the first 12 months of your Medicare coverage?  No    Healthy Habits:     In general, how would you rate your overall health?  Good    Frequency of exercise:  4-5 days/week    Duration of exercise:  15-30 minutes    Do you usually eat at least 4 servings of fruit and vegetables a day, include whole grains    & fiber and avoid regularly eating high fat or \"junk\" foods?  Yes    Taking medications regularly:  Yes    Medication side effects:  None    Ability to successfully perform activities of daily living:  Transportation requires assistance and no assistance needed    Home Safety:  No safety concerns identified    Hearing Impairment:  Difficulty following dialogue in the theater and difficulty understanding speech on the telephone    In the past 6 months, have you been bothered by leaking of urine?  No    In general, how would you rate your overall mental or emotional health?  Good      PHQ-2 Total Score: 0    Additional concerns today:  Yes      Have you ever done Advance Care Planning? (For example, a Health Directive, POLST, or a discussion with a medical provider or your loved ones about your wishes):      Fall risk  Fallen 2 or more times in the past year?: No  Any fall with injury in the past year?: No    Cognitive Screening   1) Repeat 3 items (Leader, Season, Table)    2) Clock draw: NORMAL  3) 3 item recall: Recalls 3 objects  Results: NORMAL clock, 1-2 items recalled: COGNITIVE IMPAIRMENT LESS LIKELY    Mini-CogTM Copyright DENIA Holguin. Licensed by the author for use in [facility]; reprinted with permission (susan@.[facility]). All rights reserved.      Do you have sleep apnea, excessive snoring or daytime drowsiness?: no    Reviewed and updated as needed this visit by clinical staff                  Reviewed and updated as needed " this visit by Provider                 Social History     Tobacco Use     Smoking status: Never     Smokeless tobacco: Never   Substance Use Topics     Alcohol use: No     Alcohol/week: 0.0 standard drinks         Alcohol Use 3/9/2023   Prescreen: >3 drinks/day or >7 drinks/week? Not Applicable   No flowsheet data found.        Hypertension Follow-up      Do you check your blood pressure regularly outside of the clinic? Yes     Are you following a low salt diet? No    Are your blood pressures ever more than 140 on the top number (systolic) OR more   than 90 on the bottom number (diastolic), for example 140/90? No      Current providers sharing in care for this patient include:   Patient Care Team:  Clinic - [facility] as PCP - Danie Madrid MD as Assigned PCP    The following health maintenance items are reviewed in Epic and correct as of today:  Health Maintenance   Topic Date Due     ADVANCE CARE PLANNING  05/17/2018     DTAP/TDAP/TD IMMUNIZATION (4 - Td or Tdap) 01/07/2023     ANNUAL REVIEW OF HM ORDERS  03/10/2023     MEDICARE ANNUAL WELLNESS VISIT  03/10/2023     FALL RISK ASSESSMENT  03/13/2024     LIPID  03/10/2027     DEXA  08/15/2034     HEPATITIS C SCREENING  Completed     MIGRAINE ACTION PLAN  Completed     PHQ-2 (once per calendar year)  Completed     INFLUENZA VACCINE  Completed     Pneumococcal Vaccine: 65+ Years  Completed     ZOSTER IMMUNIZATION  Completed     COVID-19 Vaccine  Completed     IPV IMMUNIZATION  Aged Out     MENINGITIS IMMUNIZATION  Aged Out     COLORECTAL CANCER SCREENING  Discontinued           Pertinent mammograms are reviewed under the imaging tab.    Review of Systems   Constitutional: Negative for chills and fever.   HENT: Positive for hearing loss. Negative for congestion, ear pain and sore throat.    Eyes: Negative for pain and visual disturbance.   Respiratory: Positive for cough. Negative for shortness of breath.    Cardiovascular: Positive  "for chest pain. Negative for palpitations and peripheral edema.   Gastrointestinal: Positive for abdominal pain. Negative for constipation, diarrhea, heartburn, hematochezia and nausea.   Breasts:  Negative for tenderness, breast mass and discharge.   Genitourinary: Negative for dysuria, frequency, genital sores, hematuria, pelvic pain, urgency, vaginal bleeding and vaginal discharge.   Musculoskeletal: Negative for arthralgias, joint swelling and myalgias.   Skin: Negative for rash.   Neurological: Negative for dizziness, weakness, headaches and paresthesias.   Psychiatric/Behavioral: Negative for mood changes. The patient is not nervous/anxious.          OBJECTIVE:   /88   Pulse 82   Temp 97.6  F (36.4  C) (Tympanic)   Resp 16   Ht 1.702 m (5' 7\")   Wt 76.2 kg (168 lb)   SpO2 98%   BMI 26.31 kg/m   Estimated body mass index is 26.31 kg/m  as calculated from the following:    Height as of this encounter: 1.702 m (5' 7\").    Weight as of this encounter: 76.2 kg (168 lb).  Physical Exam  Gen: alert and oriented, in no acute distress, affect within normal limits  Neck: supple with no masses or nodes  Throat: oropharynx clear, no exudate or tonsillar/palate asymmetry.    CV: RRR, no murmur  Lungs: clear bilaterally with good effort  Abd: nontender, no mass  Ext: no edema or lesions   Neuro: moving all extremities, gait normal, no focal deficts noted  A few normal moles, seb Ks, etc she wanted me to look at .  Nothing concerning.       ASSESSMENT / PLAN:   Wellness visit   Htn: stable  Hyperlipidemia: stable    Labs.  Fills.  F/u prn.  Doing well            COUNSELING:  Reviewed preventive health counseling, as reflected in patient instructions       Regular exercise       Healthy diet/nutrition      BMI:   Estimated body mass index is 26 kg/m  as calculated from the following:    Height as of 1/18/23: 1.702 m (5' 7\").    Weight as of 1/18/23: 75.3 kg (166 lb).         She reports that she has never smoked. " She has never used smokeless tobacco.      Appropriate preventive services were discussed with this patient, including applicable screening as appropriate for cardiovascular disease, diabetes, osteopenia/osteoporosis, and glaucoma.  As appropriate for age/gender, discussed screening for colorectal cancer, prostate cancer, breast cancer, and cervical cancer. Checklist reviewing preventive services available has been given to the patient.    Reviewed patients plan of care and provided an AVS. The Basic Care Plan (routine screening as documented in Health Maintenance) for Sofia meets the Care Plan requirement. This Care Plan has been established and reviewed with the Patient.      Alpesh Arevalo MD  [facility]    Identified Health Risks:

## 2023-03-14 ENCOUNTER — LAB (OUTPATIENT)
Dept: LAB | Facility: CLINIC | Age: 78
End: 2023-03-14
Payer: COMMERCIAL

## 2023-03-14 ENCOUNTER — TELEPHONE (OUTPATIENT)
Dept: FAMILY MEDICINE | Facility: CLINIC | Age: 78
End: 2023-03-14

## 2023-03-14 DIAGNOSIS — R17 ELEVATED BILIRUBIN: ICD-10-CM

## 2023-03-14 LAB
BASOPHILS # BLD AUTO: 0.1 10E3/UL (ref 0–0.2)
BASOPHILS NFR BLD AUTO: 1 %
EOSINOPHIL # BLD AUTO: 0.2 10E3/UL (ref 0–0.7)
EOSINOPHIL NFR BLD AUTO: 2 %
ERYTHROCYTE [DISTWIDTH] IN BLOOD BY AUTOMATED COUNT: 13 % (ref 10–15)
HCT VFR BLD AUTO: 42.9 % (ref 35–47)
HGB BLD-MCNC: 13.9 G/DL (ref 11.7–15.7)
IMM GRANULOCYTES # BLD: 0 10E3/UL
IMM GRANULOCYTES NFR BLD: 0 %
LYMPHOCYTES # BLD AUTO: 2.3 10E3/UL (ref 0.8–5.3)
LYMPHOCYTES NFR BLD AUTO: 23 %
MCH RBC QN AUTO: 29.7 PG (ref 26.5–33)
MCHC RBC AUTO-ENTMCNC: 32.4 G/DL (ref 31.5–36.5)
MCV RBC AUTO: 92 FL (ref 78–100)
MONOCYTES # BLD AUTO: 1 10E3/UL (ref 0–1.3)
MONOCYTES NFR BLD AUTO: 10 %
NEUTROPHILS # BLD AUTO: 6.4 10E3/UL (ref 1.6–8.3)
NEUTROPHILS NFR BLD AUTO: 65 %
PLATELET # BLD AUTO: 291 10E3/UL (ref 150–450)
RBC # BLD AUTO: 4.68 10E6/UL (ref 3.8–5.2)
RETICS # AUTO: 0.06 10E6/UL (ref 0.03–0.1)
RETICS/RBC NFR AUTO: 1.2 % (ref 0.5–2)
WBC # BLD AUTO: 10 10E3/UL (ref 4–11)

## 2023-03-14 PROCEDURE — 36415 COLL VENOUS BLD VENIPUNCTURE: CPT

## 2023-03-14 PROCEDURE — 85025 COMPLETE CBC W/AUTO DIFF WBC: CPT

## 2023-03-14 PROCEDURE — 85045 AUTOMATED RETICULOCYTE COUNT: CPT

## 2023-03-15 LAB
PATH REPORT.COMMENTS IMP SPEC: NORMAL
PATH REPORT.FINAL DX SPEC: NORMAL
PATH REPORT.MICROSCOPIC SPEC OTHER STN: NORMAL
PATH REPORT.MICROSCOPIC SPEC OTHER STN: NORMAL

## 2023-03-22 ENCOUNTER — HOSPITAL ENCOUNTER (OUTPATIENT)
Dept: ULTRASOUND IMAGING | Facility: CLINIC | Age: 78
Discharge: HOME | End: 2023-03-22
Attending: FAMILY MEDICINE | Admitting: FAMILY MEDICINE
Payer: COMMERCIAL

## 2023-03-22 DIAGNOSIS — R17 ELEVATED BILIRUBIN: ICD-10-CM

## 2023-03-22 PROCEDURE — 76705 ECHO EXAM OF ABDOMEN: CPT

## 2023-03-23 ENCOUNTER — MYC MEDICAL ADVICE (OUTPATIENT)
Dept: FAMILY MEDICINE | Facility: CLINIC | Age: 78
End: 2023-03-23
Payer: COMMERCIAL

## 2023-03-23 DIAGNOSIS — E78.5 HYPERLIPIDEMIA LDL GOAL <130: Primary | ICD-10-CM

## 2023-04-14 ENCOUNTER — LAB (OUTPATIENT)
Dept: LAB | Facility: CLINIC | Age: 78
End: 2023-04-14
Payer: COMMERCIAL

## 2023-04-14 DIAGNOSIS — E78.5 HYPERLIPIDEMIA LDL GOAL <130: ICD-10-CM

## 2023-04-14 LAB
CHOLEST SERPL-MCNC: 191 MG/DL
HDLC SERPL-MCNC: 72 MG/DL
LDLC SERPL CALC-MCNC: 98 MG/DL
NONHDLC SERPL-MCNC: 119 MG/DL
TRIGL SERPL-MCNC: 106 MG/DL

## 2023-04-14 PROCEDURE — 80061 LIPID PANEL: CPT

## 2023-04-14 PROCEDURE — 36415 COLL VENOUS BLD VENIPUNCTURE: CPT

## 2023-07-15 ENCOUNTER — OFFICE VISIT (OUTPATIENT)
Dept: URGENT CARE | Facility: URGENT CARE | Age: 78
End: 2023-07-15
Payer: COMMERCIAL

## 2023-07-15 VITALS
OXYGEN SATURATION: 97 % | DIASTOLIC BLOOD PRESSURE: 95 MMHG | HEART RATE: 80 BPM | BODY MASS INDEX: 26.38 KG/M2 | TEMPERATURE: 97.8 F | WEIGHT: 168.4 LBS | SYSTOLIC BLOOD PRESSURE: 144 MMHG

## 2023-07-15 DIAGNOSIS — R30.0 DYSURIA: ICD-10-CM

## 2023-07-15 DIAGNOSIS — N95.2 VAGINAL ATROPHY: ICD-10-CM

## 2023-07-15 DIAGNOSIS — N76.0 BACTERIAL VAGINOSIS: Primary | ICD-10-CM

## 2023-07-15 DIAGNOSIS — B96.89 BACTERIAL VAGINOSIS: Primary | ICD-10-CM

## 2023-07-15 LAB
ALBUMIN UR-MCNC: NEGATIVE MG/DL
APPEARANCE UR: CLEAR
BILIRUB UR QL STRIP: NEGATIVE
CLUE CELLS: PRESENT
COLOR UR AUTO: YELLOW
GLUCOSE UR STRIP-MCNC: NEGATIVE MG/DL
HGB UR QL STRIP: NEGATIVE
KETONES UR STRIP-MCNC: NEGATIVE MG/DL
LEUKOCYTE ESTERASE UR QL STRIP: NEGATIVE
NITRATE UR QL: NEGATIVE
PH UR STRIP: 7 [PH] (ref 5–7)
SP GR UR STRIP: 1.01 (ref 1–1.03)
TRICHOMONAS, WET PREP: ABNORMAL
UROBILINOGEN UR STRIP-ACNC: 0.2 E.U./DL
WBC'S/HIGH POWER FIELD, WET PREP: ABNORMAL
YEAST, WET PREP: ABNORMAL

## 2023-07-15 PROCEDURE — 99213 OFFICE O/P EST LOW 20 MIN: CPT | Performed by: FAMILY MEDICINE

## 2023-07-15 PROCEDURE — 87210 SMEAR WET MOUNT SALINE/INK: CPT | Performed by: FAMILY MEDICINE

## 2023-07-15 PROCEDURE — 81003 URINALYSIS AUTO W/O SCOPE: CPT | Performed by: FAMILY MEDICINE

## 2023-07-15 RX ORDER — METRONIDAZOLE 500 MG/1
500 TABLET ORAL 2 TIMES DAILY
Qty: 14 TABLET | Refills: 0 | Status: SHIPPED | OUTPATIENT
Start: 2023-07-15 | End: 2023-07-22

## 2023-07-15 ASSESSMENT — ENCOUNTER SYMPTOMS
ALLERGIC/IMMUNOLOGIC NEGATIVE: 1
DIFFICULTY URINATING: 1
GASTROINTESTINAL NEGATIVE: 1
DYSURIA: 1
FREQUENCY: 1
HEMATOLOGIC/LYMPHATIC NEGATIVE: 1
CONSTITUTIONAL NEGATIVE: 1
EYES NEGATIVE: 1
CARDIOVASCULAR NEGATIVE: 1
MUSCULOSKELETAL NEGATIVE: 1
ENDOCRINE NEGATIVE: 1
NEUROLOGICAL NEGATIVE: 1
PSYCHIATRIC NEGATIVE: 1
RESPIRATORY NEGATIVE: 1

## 2023-07-15 NOTE — PROGRESS NOTES
SUBJECTIVE:   Sofia Freeman is a 77 year old female presenting with a chief complaint of   Chief Complaint   Patient presents with    Urinary Problem     Burning sensation and noticed a lesion in the vaginal area x 3 days.        She is a returning patient of [place].    UTI    Onset of symptoms was 2day(s).  Course of illness is worsening  Severity moderate  Current and associated symptoms dysuria, frequency, urgency, and burning  Treatment and measures tried OTC advil or tylenol   Predisposing factors include age , menopause  Patient denies rigors, temperature > 101 degrees F., and vomiting        Patient is a 77  yr old female here for urinary symptoms. She reports her symptoms started two days ago. She reports burning and frequency and urgency.  She reports no fevers or chills, no nausea or vomiting. She also noticed a swelling around her vaginal area. She denies any discharge or odor. She is not prone to UTIS.     Review of Systems   Constitutional: Negative.    HENT: Negative.     Eyes: Negative.    Respiratory: Negative.     Cardiovascular: Negative.    Gastrointestinal: Negative.    Endocrine: Negative.    Genitourinary:  Positive for difficulty urinating, dysuria, frequency and urgency.   Musculoskeletal: Negative.    Allergic/Immunologic: Negative.    Neurological: Negative.    Hematological: Negative.    Psychiatric/Behavioral: Negative.         Past Medical History:   Diagnosis Date    Cataract 2011    MIGRAINE NOS W/O MENTN INTRACTABLE     resolved with menopause     Family History   Problem Relation Age of Onset    Hypertension Mother     Arthritis Mother     Eye Disorder Mother         glaucoma    Heart Disease Father         ?    Alzheimer Disease Father          from dementia    Hypertension Father     Cerebrovascular Disease Maternal Grandmother     Alzheimer Disease Maternal Grandmother     Diabetes Maternal Grandmother     Alcohol/Drug Maternal Grandfather     Eye Disorder Sister          glaucoma    Arthritis Sister     Eye Disorder Sister         glaucoma    Hypertension Sister     Cancer Sister         Pancreatic     Breast Cancer No family hx of     Colon Cancer No family hx of      Current Outpatient Medications   Medication Sig Dispense Refill    amLODIPine (NORVASC) 5 MG tablet Take 1 tablet (5 mg) by mouth daily 90 tablet 3    aspirin 81 MG tablet Take 1 tablet (81 mg) by mouth daily 100 tablet 6    atorvastatin (LIPITOR) 10 MG tablet Take 1/2 tab in evening 45 tablet 3    hydrochlorothiazide (HYDRODIURIL) 12.5 MG tablet Take 1 tablet (12.5 mg) by mouth daily 90 tablet 3    metroNIDAZOLE (FLAGYL) 500 MG tablet Take 1 tablet (500 mg) by mouth 2 times daily for 7 days 14 tablet 0     Social History     Tobacco Use    Smoking status: Never    Smokeless tobacco: Never   Substance Use Topics    Alcohol use: No     Alcohol/week: 0.0 standard drinks of alcohol       OBJECTIVE  BP (!) 144/95   Pulse 80   Temp 97.8  F (36.6  C) (Tympanic)   Wt 76.4 kg (168 lb 6.4 oz)   SpO2 97%   BMI 26.38 kg/m      Physical Exam  Constitutional:       Appearance: Normal appearance.   HENT:      Head: Normocephalic and atraumatic.   Eyes:      Pupils: Pupils are equal, round, and reactive to light.   Cardiovascular:      Heart sounds: No murmur heard.     No friction rub. No gallop.   Abdominal:      General: Abdomen is flat. Bowel sounds are normal. There is no distension.   Genitourinary:     General: Normal vulva.      Pubic Area: No rash.       Labia:         Right: Tenderness present.         Left: Tenderness present.       Comments: Examination showed some vaginal atrophy  Musculoskeletal:         General: Normal range of motion.   Skin:     General: Skin is warm and dry.   Neurological:      Mental Status: She is alert and oriented to person, place, and time.   Psychiatric:         Mood and Affect: Mood normal.         Behavior: Behavior normal.         Labs:  Results for orders placed or performed  in visit on 07/15/23 (from the past 24 hour(s))   UA Macroscopic with reflex to Microscopic and Culture - Clinic Collect    Specimen: Urine, Clean Catch   Result Value Ref Range    Color Urine Yellow Colorless, Straw, Light Yellow, Yellow    Appearance Urine Clear Clear    Glucose Urine Negative Negative mg/dL    Bilirubin Urine Negative Negative    Ketones Urine Negative Negative mg/dL    Specific Gravity Urine 1.015 1.003 - 1.035    Blood Urine Negative Negative    pH Urine 7.0 5.0 - 7.0    Protein Albumin Urine Negative Negative mg/dL    Urobilinogen Urine 0.2 0.2, 1.0 E.U./dL    Nitrite Urine Negative Negative    Leukocyte Esterase Urine Negative Negative    Narrative    Microscopic not indicated   Wet prep - Clinic Collect    Specimen: Vagina; Swab   Result Value Ref Range    Trichomonas Absent Absent    Yeast Absent Absent    Clue Cells Present (A) Absent    WBCs/high power field 2+ (A) None       X-Ray was not done.    ASSESSMENT:      ICD-10-CM    1. Bacterial vaginosis  N76.0 metroNIDAZOLE (FLAGYL) 500 MG tablet    B96.89       2. Dysuria  R30.0 UA Macroscopic with reflex to Microscopic and Culture - Clinic Collect     Wet prep - Clinic Collect      3. Vaginal atrophy  N95.2 Ob/Gyn Referral          Discussed lab findings with patient. No signs of UTI on labs but wet prep positive for BV.     Antibiotics faxed to the pharmacy.     Recommend seeing Ob/gyn for vaginal atrophy in light of history of PMB  Medical Decision Making:    Differential Diagnosis:  Vaginal atrophy.     Serious Comorbid Conditions:  Adult:  None    PLAN:    UTI Adult:  NO UTI    Followup:    If not improving or if condition worsens, follow up with your Primary Care Provider    There are no Patient Instructions on file for this visit.

## 2023-07-21 ENCOUNTER — OFFICE VISIT (OUTPATIENT)
Dept: OBGYN | Facility: CLINIC | Age: 78
End: 2023-07-21
Payer: COMMERCIAL

## 2023-07-21 VITALS
WEIGHT: 168 LBS | BODY MASS INDEX: 26.37 KG/M2 | TEMPERATURE: 97.1 F | HEART RATE: 80 BPM | RESPIRATION RATE: 16 BRPM | SYSTOLIC BLOOD PRESSURE: 127 MMHG | DIASTOLIC BLOOD PRESSURE: 94 MMHG | HEIGHT: 67 IN

## 2023-07-21 DIAGNOSIS — N95.2 VAGINAL ATROPHY: Primary | ICD-10-CM

## 2023-07-21 PROCEDURE — 99203 OFFICE O/P NEW LOW 30 MIN: CPT | Performed by: OBSTETRICS & GYNECOLOGY

## 2023-07-21 NOTE — PROGRESS NOTES
"Initial BP (!) 127/94 (BP Location: Right arm, Patient Position: Chair, Cuff Size: Adult Regular)   Pulse 80   Temp 97.1  F (36.2  C) (Tympanic)   Resp 16   Ht 1.702 m (5' 7\")   Wt 76.2 kg (168 lb)   BMI 26.31 kg/m   Estimated body mass index is 26.31 kg/m  as calculated from the following:    Height as of this encounter: 1.702 m (5' 7\").    Weight as of this encounter: 76.2 kg (168 lb). .      "

## 2023-07-21 NOTE — PROGRESS NOTES
[facility]  OB/GYN Clinic   Gynecology Consult Note    CC:   Chief Complaint   Patient presents with     Consult       HPI: Ms. Freeman is a 77 year old  being seen for GYN consultation for burning with urination. She looked at her vulva with a mirror and thought she saw a polyp.   Had a burning sensation of the vulva, was worried about a UTI. No signs of UTI, but vulvovaginal atrophy noted. SHe has been placing a water-based lubricant which has helped ease the burning.   She has NOT noted PMB, but polyp was noted on her exam.     GYN Hx: Menopause since 60s. No hot flashes, no HRT. Not sexually active. Hx of genital herpes - no issues with outbreaks, happens once a year. No recollection of abnormal PAP smears.     Does occasionally gets blistering rash above her tailbone and scalp. Doesn't bother her. Seems to co-incide with her genital herpes outbreak.     ROS: A 10 pt ROS was completed and found to be otherwise negative unless mentioned in the HPI.     PMH:   Past Medical History:   Diagnosis Date     Cataract 2011     MIGRAINE NOS W/O MENTN INTRACTABLE     resolved with menopause       PSHx:   Past Surgical History:   Procedure Laterality Date     APPENDECTOMY OPEN CHILD  1965    Appendectomy     COLONOSCOPY  2005    colonoscopy-normal repeat in 10 years     ESOPHAGOSCOPY, GASTROSCOPY, DUODENOSCOPY (EGD), COMBINED  2012    Procedure: COMBINED ESOPHAGOSCOPY, GASTROSCOPY, DUODENOSCOPY (EGD);  Gastroscopy;  Surgeon: Dorothy Hill MD;  Location: [facility]     PHACOEMULSIFICATION WITH STANDARD INTRAOCULAR LENS IMPLANT  2011    Procedure:PHACOEMULSIFICATION WITH STANDARD INTRAOCULAR LENS IMPLANT; Surgeon:JAY URBINA; Location:[facility]     PHACOEMULSIFICATION WITH STANDARD INTRAOCULAR LENS IMPLANT  2011    Procedure:PHACOEMULSIFICATION WITH STANDARD INTRAOCULAR LENS IMPLANT; Left Phacoemulsification with standard intraocular lens implant;  Surgeon:JAY URBINA; Location:[facility]       OBHx:   OB History    Para Term  AB Living   0 0 0 0 0 0   SAB IAB Ectopic Multiple Live Births   0 0 0 0 0       Medications:   amLODIPine (NORVASC) 5 MG tablet, Take 1 tablet (5 mg) by mouth daily  aspirin 81 MG tablet, Take 1 tablet (81 mg) by mouth daily  atorvastatin (LIPITOR) 10 MG tablet, Take 1/2 tab in evening  hydrochlorothiazide (HYDRODIURIL) 12.5 MG tablet, Take 1 tablet (12.5 mg) by mouth daily  metroNIDAZOLE (FLAGYL) 500 MG tablet, Take 1 tablet (500 mg) by mouth 2 times daily for 7 days    No current facility-administered medications on file prior to visit.      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   Social History     Socioeconomic History     Marital status:      Spouse name: Not on file     Number of children: Not on file     Years of education: Not on file     Highest education level: Not on file   Occupational History     Not on file   Tobacco Use     Smoking status: Never     Smokeless tobacco: Never   Vaping Use     Vaping Use: Never used   Substance and Sexual Activity     Alcohol use: No     Alcohol/week: 0.0 standard drinks of alcohol     Drug use: No     Sexual activity: Not Currently     Partners: Male     Birth control/protection: Post-menopausal   Other Topics Concern      Service Not Asked     Blood Transfusions Not Asked     Caffeine Concern Not Asked     Occupational Exposure Not Asked     Hobby Hazards Not Asked     Sleep Concern Not Asked     Stress Concern Not Asked     Weight Concern Not Asked     Special Diet Not Asked     Back Care Not Asked     Exercise Not Asked     Bike Helmet Not Asked     Seat Belt Not Asked     Self-Exams No     Parent/sibling w/ CABG, MI or angioplasty before 65F 55M? No   Social History Narrative     Not on file     Social Determinants of Health  "    Financial Resource Strain: Not on file   Food Insecurity: Not on file   Transportation Needs: Not on file   Physical Activity: Not on file   Stress: Not on file   Social Connections: Not on file   Intimate Partner Violence: Not on file   Housing Stability: Not on file     Social History     Socioeconomic History     Marital status:      Spouse name: None     Number of children: None     Years of education: None     Highest education level: None   Tobacco Use     Smoking status: Never     Smokeless tobacco: Never   Vaping Use     Vaping Use: Never used   Substance and Sexual Activity     Alcohol use: No     Alcohol/week: 0.0 standard drinks of alcohol     Drug use: No     Sexual activity: Not Currently     Partners: Male     Birth control/protection: Post-menopausal   Other Topics Concern     Self-Exams No     Parent/sibling w/ CABG, MI or angioplasty before 65F 55M? No       Family History:   Family History   Problem Relation Age of Onset     Hypertension Mother      Arthritis Mother      Eye Disorder Mother         glaucoma     Heart Disease Father         ?     Alzheimer Disease Father          from dementia     Hypertension Father      Cerebrovascular Disease Maternal Grandmother      Alzheimer Disease Maternal Grandmother      Diabetes Maternal Grandmother      Alcohol/Drug Maternal Grandfather      Eye Disorder Sister         glaucoma     Arthritis Sister      Eye Disorder Sister         glaucoma     Hypertension Sister      Cancer Sister         Pancreatic      Breast Cancer No family hx of      Colon Cancer No family hx of    No ovarian, cervical, uterine or breast cancer.     Physical Exam:   Vitals:    23 1016   BP: (!) 127/94   BP Location: Right arm   Patient Position: Chair   Cuff Size: Adult Regular   Pulse: 80   Resp: 16   Temp: 97.1  F (36.2  C)   TempSrc: Tympanic   Weight: 76.2 kg (168 lb)   Height: 1.702 m (5' 7\")      Estimated body mass index is 26.31 kg/m  as calculated from " "the following:    Height as of this encounter: 1.702 m (5' 7\").    Weight as of this encounter: 76.2 kg (168 lb).    Gen: Pleasant, talkative female in no apparent distress   Respiratory: breathing comfortably on room air   Cardiac: Regular rate, warm and well-perfused.   GI: Abd soft and non-tender  : moderate vulvovaginal atrophy with loss of the distinction between labia minora and majora, narrowing of the introitus, thinning of tissues. Normal cervix with no polyp. Points to urethra at the area of concern for polyp. No evidence of polyp, but mild erythema of urethra   MSK: Grossly normal movement of all four extremities  Psych: mood and affect bright     A&P: 78 yo with vulvovaginal atrophy.   Reviewed pathophysiology. Discussed topical estrogen as treatment; estring, vagifem or estrogen cream. Declines at this time and symptoms are controlled with placement of a lubricant.   Ok for a same day appointment if she get s ?herpes outbreak to help confirm diagnosis.  I spent 30 min with the patient and then an additional 7 min in documentation and chart review.     Romana Lopez MD  OB/GYN  7/21/2023            "

## 2023-08-21 ENCOUNTER — ALLIED HEALTH/NURSE VISIT (OUTPATIENT)
Dept: FAMILY MEDICINE | Facility: CLINIC | Age: 78
End: 2023-08-21
Payer: COMMERCIAL

## 2023-08-21 ENCOUNTER — TELEPHONE (OUTPATIENT)
Dept: FAMILY MEDICINE | Facility: CLINIC | Age: 78
End: 2023-08-21

## 2023-08-21 ENCOUNTER — TELEPHONE (OUTPATIENT)
Dept: FAMILY MEDICINE | Facility: CLINIC | Age: 78
End: 2023-08-21
Payer: COMMERCIAL

## 2023-08-21 VITALS — SYSTOLIC BLOOD PRESSURE: 124 MMHG | DIASTOLIC BLOOD PRESSURE: 80 MMHG

## 2023-08-21 DIAGNOSIS — I10 ESSENTIAL HYPERTENSION WITH GOAL BLOOD PRESSURE LESS THAN 140/90: Primary | ICD-10-CM

## 2023-08-21 PROCEDURE — 99207 PR NO CHARGE NURSE ONLY: CPT | Performed by: FAMILY MEDICINE

## 2023-08-21 NOTE — PROGRESS NOTES
Patient Quality Outreach    Patient is due for the following:   Hypertension -  BP check    Next Steps:   None     Type of outreach:    Chart review performed, no outreach needed.  Spoke with patient she is checking blood pressures at home   Last reading was       Questions for provider review:    None           Lorena Mulligan MA

## 2023-08-21 NOTE — TELEPHONE ENCOUNTER
Patient Quality Outreach    Patient is due for the following:   Hypertension -  BP check    Next Steps:   Schedule a nurse only visit for bp    Type of outreach:    Phone, left message for patient/parent to call back.      Questions for provider review:    None           Lorena Mulligan MA

## 2023-10-23 ENCOUNTER — IMMUNIZATION (OUTPATIENT)
Dept: FAMILY MEDICINE | Facility: CLINIC | Age: 78
End: 2023-10-23
Payer: COMMERCIAL

## 2023-10-23 DIAGNOSIS — Z23 HIGH PRIORITY FOR 2019-NCOV VACCINE: Primary | ICD-10-CM

## 2023-10-23 PROCEDURE — 90480 ADMN SARSCOV2 VAC 1/ONLY CMP: CPT

## 2023-10-23 PROCEDURE — 91320 SARSCV2 VAC 30MCG TRS-SUC IM: CPT

## 2023-11-20 ENCOUNTER — IMMUNIZATION (OUTPATIENT)
Dept: FAMILY MEDICINE | Facility: CLINIC | Age: 78
End: 2023-11-20
Payer: COMMERCIAL

## 2023-11-20 PROCEDURE — 90662 IIV NO PRSV INCREASED AG IM: CPT

## 2023-11-20 PROCEDURE — G0008 ADMIN INFLUENZA VIRUS VAC: HCPCS

## 2023-12-14 ENCOUNTER — OFFICE VISIT (OUTPATIENT)
Dept: URGENT CARE | Facility: URGENT CARE | Age: 78
End: 2023-12-14
Payer: COMMERCIAL

## 2023-12-14 VITALS
RESPIRATION RATE: 16 BRPM | OXYGEN SATURATION: 95 % | SYSTOLIC BLOOD PRESSURE: 144 MMHG | HEART RATE: 95 BPM | TEMPERATURE: 98.4 F | DIASTOLIC BLOOD PRESSURE: 91 MMHG

## 2023-12-14 DIAGNOSIS — R30.0 DYSURIA: Primary | ICD-10-CM

## 2023-12-14 LAB
ALBUMIN UR-MCNC: NEGATIVE MG/DL
APPEARANCE UR: CLEAR
BILIRUB UR QL STRIP: NEGATIVE
CLUE CELLS: ABNORMAL
COLOR UR AUTO: YELLOW
GLUCOSE UR STRIP-MCNC: NEGATIVE MG/DL
HGB UR QL STRIP: NEGATIVE
KETONES UR STRIP-MCNC: NEGATIVE MG/DL
LEUKOCYTE ESTERASE UR QL STRIP: NEGATIVE
NITRATE UR QL: NEGATIVE
PH UR STRIP: 6.5 [PH] (ref 5–7)
SP GR UR STRIP: 1.01 (ref 1–1.03)
TRICHOMONAS, WET PREP: ABNORMAL
UROBILINOGEN UR STRIP-ACNC: 0.2 E.U./DL
WBC'S/HIGH POWER FIELD, WET PREP: ABNORMAL
YEAST, WET PREP: ABNORMAL

## 2023-12-14 PROCEDURE — 87210 SMEAR WET MOUNT SALINE/INK: CPT

## 2023-12-14 PROCEDURE — 81003 URINALYSIS AUTO W/O SCOPE: CPT

## 2023-12-14 PROCEDURE — 99213 OFFICE O/P EST LOW 20 MIN: CPT

## 2023-12-14 NOTE — PROGRESS NOTES
URGENT CARE  Assessment & Plan   Assessment:   Sofia Freeman is a 78 year old female who's clinical presentation today is consistent with:   1. Dysuria  - UA Macroscopic with reflex to Microscopic and Culture  - Wet preparation  Plan:  No signs of a UTI, and wet prep was also reassuring, patient is okay monitoring her symptoms at home without any treatment   Additionally we discussed if symptoms do not improve after starting today's treatment (or if symptoms worsen) to follow up in 3-5 days.    No alarm signs or symptoms present   Differential Diagnoses for this patient's chief complaint that I considered include:  Bacterial vaginosis, candidiasis, Vulvovaginitis, atrophic vaginitis, Overactive bladder, urethritis, interstitial cystitis, urethral irritation,  Pyelonephritis, nephrolithiasis, Pelvic organ (uterine/bladder) prolapse, Foreign body in bladder,  Atypical etiology of cystitis: fungal, viral, contact vs allergic vaginitis,  Malignancy (vaginal, ovarian, cervical)     Patient is agreeable to treatment plan and state they will follow-up if symptoms do not improve and/or if symptoms worsen   see patient's AVS 'monitor for' section for specific patient instructions given and discussed regarding what to watch for and when to follow up    JESSICA Son [facility] [place]      ______________________________________________________________________      Subjective     HPI: Sofia Freeman  is a 78 year old  female who presents today for evaluation the following concerns:   The patient presents today complaining of  urinary frequency and pressure  for a few days  Patient denies} blood in urine, burning, pelvic pain, and sensation of incomplete bladder emptying}    Patient endorses having a past history of one previous urinary tract infection  Patient denies back pain/flank pain, fever, chills, or any abnormal vaginal discharge/odor or bleeding.     Review of Systems:  Pertinent  review of systems as reflected in HPI, otherwise negative.     Objective    Physical Exam:  Vitals:    12/14/23 1621 12/14/23 1623   BP: (!) 146/93 (!) 144/91   Pulse: 95    Resp: 16    Temp: 98.4  F (36.9  C)    TempSrc: Tympanic    SpO2: 95%       General: Alert and oriented, no acute distress, afebrile, normotensive  Psy/mental status: Nonanxious, cooperative  SKIN: Intact, no open areas  ABDOMEN:  soft, non-tender, non-distended    No flank pain or CVA tenderness to palpation bilaterally  Pelvic/ :   Deferred    LABS:   Results for orders placed or performed in visit on 12/14/23   UA Macroscopic with reflex to Microscopic and Culture     Status: Normal    Specimen: Urine, Clean Catch   Result Value Ref Range    Color Urine Yellow Colorless, Straw, Light Yellow, Yellow    Appearance Urine Clear Clear    Glucose Urine Negative Negative mg/dL    Bilirubin Urine Negative Negative    Ketones Urine Negative Negative mg/dL    Specific Gravity Urine 1.010 1.003 - 1.035    Blood Urine Negative Negative    pH Urine 6.5 5.0 - 7.0    Protein Albumin Urine Negative Negative mg/dL    Urobilinogen Urine 0.2 0.2, 1.0 E.U./dL    Nitrite Urine Negative Negative    Leukocyte Esterase Urine Negative Negative    Narrative    Microscopic not indicated   Wet preparation     Status: Abnormal    Specimen: Vagina; Swab   Result Value Ref Range    Trichomonas Absent Absent    Yeast Absent Absent    Clue Cells Absent Absent    WBCs/high power field 2+ (A) None        ______________________________________________________________________    I explained my diagnostic considerations and recommendations to the patient, who voiced understanding and agreement with the treatment plan.   All questions were answered.   We discussed potential side effects, risks and benefits of any prescribed or recommended therapies, as well as expectations for response to treatments.  Please see AVS for any patient instructions & handouts given.   Patient was  advised to contact the Nurse Care Line, their Primary Care provider, Urgent Care, or the Emergency Department if there are new or worsening symptoms, or call 911 for emergencies.

## 2024-03-18 DIAGNOSIS — I10 ESSENTIAL HYPERTENSION WITH GOAL BLOOD PRESSURE LESS THAN 140/90: ICD-10-CM

## 2024-03-18 RX ORDER — HYDROCHLOROTHIAZIDE 12.5 MG/1
12.5 TABLET ORAL DAILY
Qty: 90 TABLET | Refills: 3 | Status: SHIPPED | OUTPATIENT
Start: 2024-03-18 | End: 2024-04-22

## 2024-04-15 DIAGNOSIS — I10 ESSENTIAL HYPERTENSION WITH GOAL BLOOD PRESSURE LESS THAN 140/90: ICD-10-CM

## 2024-04-15 RX ORDER — AMLODIPINE BESYLATE 5 MG/1
5 TABLET ORAL DAILY
Qty: 30 TABLET | Refills: 0 | Status: SHIPPED | OUTPATIENT
Start: 2024-04-15 | End: 2024-04-22

## 2024-04-15 NOTE — TELEPHONE ENCOUNTER
Has appointment scheduled for 4/22/24.      Requested Prescriptions   Pending Prescriptions Disp Refills    amLODIPine (NORVASC) 5 MG tablet [Pharmacy Med Name: AMLODIPINE BESYLATE 5MG TABS] 90 tablet 3     Sig: TAKE 1 TABLET (5 MG) BY MOUTH DAILY       Calcium Channel Blockers Protocol  Failed - 4/15/2024  6:48 AM        Failed - Blood pressure under 140/90 in past 12 months     BP Readings from Last 3 Encounters:   12/14/23 (!) 144/91   08/21/23 124/80   07/21/23 (!) 127/94       No data recorded            Failed - Normal serum creatinine on file in past 12 months     Recent Labs   Lab Test 03/13/23  0814   CR 0.77                 Passed - Recent (12 mo) or future (30 days) visit within the authorizing provider's specialty     The patient must have completed an in-person or virtual visit within the past 12 months or has a future visit scheduled within the next 90 days with the authorizing provider s specialty.  Urgent care and e-visits do not quality as an office visit for this protocol.          Passed - Medication is active on med list        Passed - Patient is age 18 or older        Passed - No active pregnancy on record        Passed - No positive pregnancy test in past 12 months

## 2024-04-22 ENCOUNTER — OFFICE VISIT (OUTPATIENT)
Dept: FAMILY MEDICINE | Facility: CLINIC | Age: 79
End: 2024-04-22
Payer: COMMERCIAL

## 2024-04-22 VITALS
DIASTOLIC BLOOD PRESSURE: 80 MMHG | HEIGHT: 65 IN | BODY MASS INDEX: 28.66 KG/M2 | OXYGEN SATURATION: 97 % | SYSTOLIC BLOOD PRESSURE: 120 MMHG | WEIGHT: 172 LBS | HEART RATE: 78 BPM | RESPIRATION RATE: 18 BRPM | TEMPERATURE: 97.8 F

## 2024-04-22 DIAGNOSIS — Z00.00 MEDICARE ANNUAL WELLNESS VISIT, SUBSEQUENT: Primary | ICD-10-CM

## 2024-04-22 DIAGNOSIS — I10 ESSENTIAL HYPERTENSION WITH GOAL BLOOD PRESSURE LESS THAN 140/90: ICD-10-CM

## 2024-04-22 DIAGNOSIS — E80.4 GILBERT'S DISEASE: ICD-10-CM

## 2024-04-22 DIAGNOSIS — E78.5 HYPERLIPIDEMIA LDL GOAL <130: ICD-10-CM

## 2024-04-22 LAB
ANION GAP SERPL CALCULATED.3IONS-SCNC: 9 MMOL/L (ref 7–15)
BUN SERPL-MCNC: 16.1 MG/DL (ref 8–23)
CALCIUM SERPL-MCNC: 9.8 MG/DL (ref 8.8–10.2)
CHLORIDE SERPL-SCNC: 94 MMOL/L (ref 98–107)
CHOLEST SERPL-MCNC: 202 MG/DL
CREAT SERPL-MCNC: 0.78 MG/DL (ref 0.51–0.95)
DEPRECATED HCO3 PLAS-SCNC: 30 MMOL/L (ref 22–29)
EGFRCR SERPLBLD CKD-EPI 2021: 76 ML/MIN/1.73M2
FASTING STATUS PATIENT QL REPORTED: YES
GLUCOSE SERPL-MCNC: 89 MG/DL (ref 70–99)
HDLC SERPL-MCNC: 74 MG/DL
LDLC SERPL CALC-MCNC: 113 MG/DL
NONHDLC SERPL-MCNC: 128 MG/DL
POTASSIUM SERPL-SCNC: 4.2 MMOL/L (ref 3.4–5.3)
SODIUM SERPL-SCNC: 133 MMOL/L (ref 135–145)
TRIGL SERPL-MCNC: 73 MG/DL

## 2024-04-22 PROCEDURE — 80048 BASIC METABOLIC PNL TOTAL CA: CPT | Performed by: FAMILY MEDICINE

## 2024-04-22 PROCEDURE — G0439 PPPS, SUBSEQ VISIT: HCPCS | Performed by: FAMILY MEDICINE

## 2024-04-22 PROCEDURE — 99214 OFFICE O/P EST MOD 30 MIN: CPT | Mod: 25 | Performed by: FAMILY MEDICINE

## 2024-04-22 PROCEDURE — 80061 LIPID PANEL: CPT | Performed by: FAMILY MEDICINE

## 2024-04-22 PROCEDURE — 36415 COLL VENOUS BLD VENIPUNCTURE: CPT | Performed by: FAMILY MEDICINE

## 2024-04-22 RX ORDER — HYDROCHLOROTHIAZIDE 12.5 MG/1
12.5 TABLET ORAL DAILY
Qty: 90 TABLET | Refills: 3 | Status: SHIPPED | OUTPATIENT
Start: 2024-04-22

## 2024-04-22 RX ORDER — RESPIRATORY SYNCYTIAL VIRUS VACCINE 120MCG/0.5
0.5 KIT INTRAMUSCULAR ONCE
Qty: 1 EACH | Refills: 0 | Status: CANCELLED | OUTPATIENT
Start: 2024-04-22 | End: 2024-04-22

## 2024-04-22 RX ORDER — AMLODIPINE BESYLATE 5 MG/1
5 TABLET ORAL DAILY
Qty: 90 TABLET | Refills: 3 | Status: SHIPPED | OUTPATIENT
Start: 2024-04-22

## 2024-04-22 RX ORDER — ATORVASTATIN CALCIUM 10 MG/1
TABLET, FILM COATED ORAL
Qty: 45 TABLET | Refills: 3 | Status: SHIPPED | OUTPATIENT
Start: 2024-04-22

## 2024-04-22 SDOH — HEALTH STABILITY: PHYSICAL HEALTH: ON AVERAGE, HOW MANY DAYS PER WEEK DO YOU ENGAGE IN MODERATE TO STRENUOUS EXERCISE (LIKE A BRISK WALK)?: 5 DAYS

## 2024-04-22 ASSESSMENT — SOCIAL DETERMINANTS OF HEALTH (SDOH): HOW OFTEN DO YOU GET TOGETHER WITH FRIENDS OR RELATIVES?: ONCE A WEEK

## 2024-04-22 ASSESSMENT — PAIN SCALES - GENERAL: PAINLEVEL: NO PAIN (0)

## 2024-04-22 NOTE — PROGRESS NOTES
"Preventive Care Visit  [facility]  Alpesh Arevalo MD, Family Medicine  Apr 22, 2024      Assessment & Plan   Well exam  Htn, st able  Hyperlipidemia, stable  Gilbert's syndrome, stable    fills on meds for chronic issues as above in med list and orders.   Labs ordered as appropriate for monitoring the listed medical conditions.    Added gilbert's to her problem list.  Adequate workup done to rule out other conditions.    Continue medications for medical issues as above in med list and orders      BMI  Estimated body mass index is 28.83 kg/m  as calculated from the following:    Height as of this encounter: 1.645 m (5' 4.76\").    Weight as of this encounter: 78 kg (172 lb).       Counseling  Appropriate preventive services were discussed with this patient, including applicable screening as appropriate for fall prevention, nutrition, physical activity, Tobacco-use cessation, weight loss and cognition.  Checklist reviewing preventive services available has been given to the patient.  Reviewed patient's diet, addressing concerns and/or questions.   The patient was instructed to see the dentist every 6 months.           Sylvia sheth is a 78 year old, presenting for the following:  Wellness Visit  Htn, stable, hydrochlorothiazide and norvasc   Hyperlipidemia, stable on statin   Gilbert's syndrome, stable, no jaundice or concern for other dz process          4/22/2024     9:10 AM   Additional Questions   Roomed by jem   Accompanied by self       Health Care Directive  Patient does not have a Health Care Directive or Living Will:           4/22/2024   General Health   How would you rate your overall physical health? Good   Feel stress (tense, anxious, or unable to sleep) Not at all         4/22/2024   Nutrition   Diet: Low fat/cholesterol         4/22/2024   Exercise   Days per week of moderate/strenous exercise 5 days         4/22/2024   Social Factors   Frequency of gathering with " friends or relatives Once a week   Worry food won't last until get money to buy more No   Food not last or not have enough money for food? No   Do you have housing?  Yes   Are you worried about losing your housing? No   Lack of transportation? No   Unable to get utilities (heat,electricity)? No         4/22/2024   Fall Risk   Fallen 2 or more times in the past year? No   Trouble with walking or balance? No          4/22/2024   Activities of Daily Living- Home Safety   Needs help with the following daily activites None of the above   Safety concerns in the home None of the above         4/22/2024   Dental   Dentist two times every year? (!) NO         4/22/2024   Hearing Screening   Hearing concerns? None of the above         4/22/2024   Driving Risk Screening   Patient/family members have concerns about driving No         4/22/2024   General Alertness/Fatigue Screening   Have you been more tired than usual lately? No         4/22/2024   Urinary Incontinence Screening   Bothered by leaking urine in past 6 months No         4/22/2024   TB Screening   Were you born outside of the [place]? No         Today's PHQ-2 Score:       4/22/2024     8:55 AM   PHQ-2 ( 1999 [employer])   Q1: Little interest or pleasure in doing things 0   Q2: Feeling down, depressed or hopeless 0   PHQ-2 Score 0   Q1: Little interest or pleasure in doing things Not at all   Q2: Feeling down, depressed or hopeless Not at all   PHQ-2 Score 0           4/22/2024   Substance Use   Alcohol more than 3/day or more than 7/wk Not Applicable   Do you have a current opioid prescription? No   How severe/bad is pain from 1 to 10? 0/10 (No Pain)   Do you use any other substances recreationally? No     Social History     Tobacco Use    Smoking status: Never    Smokeless tobacco: Never   Vaping Use    Vaping status: Never Used   Substance Use Topics    Alcohol use: No     Alcohol/week: 0.0 standard drinks of alcohol    Drug use: No         9/28/2022   LAST FHS-7 RESULTS    1st degree relative breast or ovarian cancer No   Any relative bilateral breast cancer No   Any male have breast cancer No   Any ONE woman have BOTH breast AND ovarian cancer No   Any woman with breast cancer before 50yrs No   2 or more relatives with breast AND/OR ovarian cancer No   2 or more relatives with breast AND/OR bowel cancer No             Reviewed and updated as needed this visit by Provider                    Current providers sharing in care for this patient include:  Patient Care Team:  Alpesh Arevalo MD as PCP - General (Family Medicine)  Alpesh Arevalo MD as Assigned PCP  Romana Lopez MD as Assigned OBGYN Provider    The following health maintenance items are reviewed in Epic and correct as of today:  Health Maintenance   Topic Date Due    RSV VACCINE (Pregnancy & 60+) (1 - 1-dose 60+ series) Never done    ADVANCE CARE PLANNING  05/17/2018    ANNUAL REVIEW OF HM ORDERS  03/10/2023    MEDICARE ANNUAL WELLNESS VISIT  03/13/2024    LIPID  04/14/2024    FALL RISK ASSESSMENT  04/22/2025    GLUCOSE  03/13/2026    DTAP/TDAP/TD IMMUNIZATION (3 - Td or Tdap) 03/13/2033    DEXA  08/15/2034    MIGRAINE ACTION PLAN  Completed    PHQ-2 (once per calendar year)  Completed    INFLUENZA VACCINE  Completed    Pneumococcal Vaccine: 65+ Years  Completed    ZOSTER IMMUNIZATION  Completed    COVID-19 Vaccine  Completed    IPV IMMUNIZATION  Aged Out    HPV IMMUNIZATION  Aged Out    MENINGITIS IMMUNIZATION  Aged Out    RSV MONOCLONAL ANTIBODY  Aged Out    COLORECTAL CANCER SCREENING  Discontinued       Current Outpatient Medications   Medication Sig Dispense Refill    amLODIPine (NORVASC) 5 MG tablet Take 1 tablet (5 mg) by mouth daily 90 tablet 3    aspirin 81 MG tablet Take 1 tablet (81 mg) by mouth daily 100 tablet 6    atorvastatin (LIPITOR) 10 MG tablet Take 1/2 tab in evening 45 tablet 3    hydroCHLOROthiazide 12.5 MG tablet Take 1 tablet (12.5 mg) by mouth daily 90 tablet 3     No current  "facility-administered medications for this visit.         Objective    Exam  /80   Pulse 78   Temp 97.8  F (36.6  C) (Tympanic)   Resp 18   Ht 1.645 m (5' 4.76\")   Wt 78 kg (172 lb)   SpO2 97%   BMI 28.83 kg/m     Estimated body mass index is 28.83 kg/m  as calculated from the following:    Height as of this encounter: 1.645 m (5' 4.76\").    Weight as of this encounter: 78 kg (172 lb).    Physical Exam  GEN: Alert and oriented, in no acute distress  CV: RRR, no murmur  RESP: lungs clear bilaterally, good effort  EXT: no edema or lesions noted in lower extremities  Normal gait          4/22/2024   Mini Cog   Clock Draw Score 2 Normal   3 Item Recall 2 objects recalled   Mini Cog Total Score 4              Signed Electronically by: Alpesh Arevalo MD    "

## 2024-05-08 ENCOUNTER — ANCILLARY PROCEDURE (OUTPATIENT)
Dept: MAMMOGRAPHY | Facility: CLINIC | Age: 79
End: 2024-05-08
Attending: FAMILY MEDICINE
Payer: COMMERCIAL

## 2024-05-08 DIAGNOSIS — Z12.31 VISIT FOR SCREENING MAMMOGRAM: ICD-10-CM

## 2024-05-08 PROCEDURE — 77067 SCR MAMMO BI INCL CAD: CPT | Mod: TC | Performed by: RADIOLOGY

## 2024-05-08 PROCEDURE — 77063 BREAST TOMOSYNTHESIS BI: CPT | Mod: TC | Performed by: RADIOLOGY

## 2024-07-30 ENCOUNTER — HOSPITAL ENCOUNTER (EMERGENCY)
Facility: CLINIC | Age: 79
Discharge: HOME | End: 2024-07-30
Attending: EMERGENCY MEDICINE | Admitting: EMERGENCY MEDICINE
Payer: COMMERCIAL

## 2024-07-30 ENCOUNTER — TELEPHONE (OUTPATIENT)
Dept: OPHTHALMOLOGY | Facility: CLINIC | Age: 79
End: 2024-07-30

## 2024-07-30 VITALS
OXYGEN SATURATION: 93 % | SYSTOLIC BLOOD PRESSURE: 139 MMHG | HEART RATE: 102 BPM | RESPIRATION RATE: 18 BRPM | TEMPERATURE: 98.2 F | DIASTOLIC BLOOD PRESSURE: 88 MMHG

## 2024-07-30 DIAGNOSIS — H53.9 VISION CHANGES: ICD-10-CM

## 2024-07-30 LAB
ANION GAP SERPL CALCULATED.3IONS-SCNC: 14 MMOL/L (ref 7–15)
BASOPHILS # BLD AUTO: 0.1 10E3/UL (ref 0–0.2)
BASOPHILS NFR BLD AUTO: 1 %
BUN SERPL-MCNC: 14.6 MG/DL (ref 8–23)
CALCIUM SERPL-MCNC: 9.3 MG/DL (ref 8.8–10.4)
CHLORIDE SERPL-SCNC: 97 MMOL/L (ref 98–107)
CREAT SERPL-MCNC: 0.69 MG/DL (ref 0.51–0.95)
EGFRCR SERPLBLD CKD-EPI 2021: 87 ML/MIN/1.73M2
EOSINOPHIL # BLD AUTO: 0.1 10E3/UL (ref 0–0.7)
EOSINOPHIL NFR BLD AUTO: 2 %
ERYTHROCYTE [DISTWIDTH] IN BLOOD BY AUTOMATED COUNT: 12.7 % (ref 10–15)
GLUCOSE SERPL-MCNC: 96 MG/DL (ref 70–99)
HCO3 SERPL-SCNC: 25 MMOL/L (ref 22–29)
HCT VFR BLD AUTO: 42.2 % (ref 35–47)
HGB BLD-MCNC: 14.6 G/DL (ref 11.7–15.7)
IMM GRANULOCYTES # BLD: 0 10E3/UL
IMM GRANULOCYTES NFR BLD: 0 %
LYMPHOCYTES # BLD AUTO: 1.5 10E3/UL (ref 0.8–5.3)
LYMPHOCYTES NFR BLD AUTO: 16 %
MCH RBC QN AUTO: 30.7 PG (ref 26.5–33)
MCHC RBC AUTO-ENTMCNC: 34.6 G/DL (ref 31.5–36.5)
MCV RBC AUTO: 89 FL (ref 78–100)
MONOCYTES # BLD AUTO: 0.8 10E3/UL (ref 0–1.3)
MONOCYTES NFR BLD AUTO: 8 %
NEUTROPHILS # BLD AUTO: 6.6 10E3/UL (ref 1.6–8.3)
NEUTROPHILS NFR BLD AUTO: 73 %
NRBC # BLD AUTO: 0 10E3/UL
NRBC BLD AUTO-RTO: 0 /100
PLATELET # BLD AUTO: 293 10E3/UL (ref 150–450)
POTASSIUM SERPL-SCNC: 3.5 MMOL/L (ref 3.4–5.3)
RBC # BLD AUTO: 4.76 10E6/UL (ref 3.8–5.2)
SODIUM SERPL-SCNC: 136 MMOL/L (ref 135–145)
WBC # BLD AUTO: 9.1 10E3/UL (ref 4–11)

## 2024-07-30 PROCEDURE — 80048 BASIC METABOLIC PNL TOTAL CA: CPT | Performed by: EMERGENCY MEDICINE

## 2024-07-30 PROCEDURE — 99283 EMERGENCY DEPT VISIT LOW MDM: CPT | Performed by: EMERGENCY MEDICINE

## 2024-07-30 PROCEDURE — 85041 AUTOMATED RBC COUNT: CPT | Performed by: EMERGENCY MEDICINE

## 2024-07-30 PROCEDURE — 36415 COLL VENOUS BLD VENIPUNCTURE: CPT | Performed by: EMERGENCY MEDICINE

## 2024-07-30 ASSESSMENT — ACTIVITIES OF DAILY LIVING (ADL)
ADLS_ACUITY_SCORE: 35
ADLS_ACUITY_SCORE: 35

## 2024-07-30 NOTE — DISCHARGE INSTRUCTIONS
Recommend follow up with eye clinic in the coming week.   Return or be seen if new/worsening symptoms develop.

## 2024-07-30 NOTE — ED NOTES
Bed: ED22  Expected date: 7/30/24  Expected time: 9:56 AM  Means of arrival:   Comments:  EMS- CM, vision disturbance

## 2024-07-30 NOTE — TELEPHONE ENCOUNTER
Transient vision loss for minutes per ED note    Stroke work up/TIA work up performed     No visual symptoms at time of ED.    May review scheduling in optometry this Friday or same day appt next week with Dr. Fernández.    Will ask triage personnel in clinic tomorrow to assist in scheduling options.    Charly Kaur RN 5:35 PM 07/30/24

## 2024-07-30 NOTE — ED PROVIDER NOTES
ED Provider Note  [facility]      History     Chief Complaint   Patient presents with    Eye Problem     HPI  Sofia Freeman is a 79 year old female who presents to the emergency department with concerns regarding vision changes involving the left eye.  This began at 830 at this morning as patient was on the computer.  She went to bed feeling well yesterday evening.  She awoke feeling well this morning.  As she was watching the computer, doing activities on the computer she noted Jagger, and flashing lights of the left eye.  There was no vision change of the right eye.  Denied any headache.  No lightheadedness, dizziness, with the exception of patient felt as if she was developing palpitations, and almost sweating as she began feeling increasingly anxious, and fearful.  This was a short duration of time.  The floaters and flashing lights lasted less than 30 minutes, and went away on its own.  No severe headache.  No weakness of the arms, or legs.  No paresthesias.  Denies any hearing changes.  No severe headache.  Not on blood thinning medications.  No injury or other symptoms.        Independent Historian:        Review of External Notes:  Reviewed office visit from April 22.  No acute findings on Medicare annual wellness visit      Allergies:  Allergies   Allergen Reactions    Nkda [No Known Drug Allergy]        Problem List:    Patient Active Problem List    Diagnosis Date Noted    Gilbert's disease 04/22/2024     Priority: Medium     Ultrasound, smear normal, retic count normal       HYPERLIPIDEMIA LDL GOAL <130 10/31/2010     Priority: Medium    Hypertension goal BP (blood pressure) < 140/90 06/19/2006     Priority: Medium    Genital herpes      Priority: Medium        Past Medical History:    Past Medical History:   Diagnosis Date    Cataract 8/17/2011    MIGRAINE NOS W/O MENTN INTRACTABLE        Past Surgical History:    Past Surgical History:   Procedure Laterality Date     APPENDECTOMY OPEN CHILD  1965    Appendectomy    COLONOSCOPY  2005    colonoscopy-normal repeat in 10 years    ESOPHAGOSCOPY, GASTROSCOPY, DUODENOSCOPY (EGD), COMBINED  2012    Procedure: COMBINED ESOPHAGOSCOPY, GASTROSCOPY, DUODENOSCOPY (EGD);  Gastroscopy;  Surgeon: Dorothy Hill MD;  Location: [facility]    PHACOEMULSIFICATION WITH STANDARD INTRAOCULAR LENS IMPLANT  2011    Procedure:PHACOEMULSIFICATION WITH STANDARD INTRAOCULAR LENS IMPLANT; Surgeon:JAY URBINA; Location:[facility]    PHACOEMULSIFICATION WITH STANDARD INTRAOCULAR LENS IMPLANT  2011    Procedure:PHACOEMULSIFICATION WITH STANDARD INTRAOCULAR LENS IMPLANT; Left Phacoemulsification with standard intraocular lens implant; Surgeon:JAY URBINA; Location:[facility]       Family History:    Family History   Problem Relation Age of Onset    Hypertension Mother     Arthritis Mother     Eye Disorder Mother         glaucoma    Heart Disease Father         ?    Alzheimer Disease Father          from dementia    Hypertension Father     Cerebrovascular Disease Maternal Grandmother     Alzheimer Disease Maternal Grandmother     Diabetes Maternal Grandmother     Alcohol/Drug Maternal Grandfather     Eye Disorder Sister         glaucoma    Arthritis Sister     Eye Disorder Sister         glaucoma    Hypertension Sister     Cancer Sister         Pancreatic     Breast Cancer No family hx of     Colon Cancer No family hx of        Social History:  Marital Status:   [5]  Social History     Tobacco Use    Smoking status: Never    Smokeless tobacco: Never   Vaping Use    Vaping status: Never Used   Substance Use Topics    Alcohol use: No     Alcohol/week: 0.0 standard drinks of alcohol    Drug use: No        Medications:    amLODIPine (NORVASC) 5 MG tablet  aspirin 81 MG tablet  atorvastatin (LIPITOR) 10 MG tablet  hydroCHLOROthiazide 12.5 MG tablet          Review of Systems  A medically appropriate review of systems was  performed with pertinent positives and negatives noted in the HPI, and all other systems negative.    Physical Exam   Patient Vitals for the past 24 hrs:   BP Temp Temp src Pulse Resp SpO2   07/30/24 1003 139/88 98.2  F (36.8  C) Oral 102 18 99 %          Physical Exam  General: alert and in no acute distress on arrival  Head: atraumatic, normocephalic  Lungs:  nonlabored  CV:  extremities warm and perfused  Abd: nondistended  Skin: no rashes, no diaphoresis and skin color normal  Neuro: Patient awake, alert, speech is fluent,   Psychiatric: affect/mood normal,        ED Course                 Procedures                           Results for orders placed or performed during the hospital encounter of 07/30/24 (from the past 24 hour(s))   CBC with platelets differential    Narrative    The following orders were created for panel order CBC with platelets differential.  Procedure                               Abnormality         Status                     ---------                               -----------         ------                     CBC with platelets and d...[[id number]]                      Final result                 Please view results for these tests on the individual orders.   Basic metabolic panel   Result Value Ref Range    Sodium 136 135 - 145 mmol/L    Potassium 3.5 3.4 - 5.3 mmol/L    Chloride 97 (L) 98 - 107 mmol/L    Carbon Dioxide (CO2) 25 22 - 29 mmol/L    Anion Gap 14 7 - 15 mmol/L    Urea Nitrogen 14.6 8.0 - 23.0 mg/dL    Creatinine 0.69 0.51 - 0.95 mg/dL    GFR Estimate 87 >60 mL/min/1.73m2    Calcium 9.3 8.8 - 10.4 mg/dL    Glucose 96 70 - 99 mg/dL   CBC with platelets and differential   Result Value Ref Range    WBC Count 9.1 4.0 - 11.0 10e3/uL    RBC Count 4.76 3.80 - 5.20 10e6/uL    Hemoglobin 14.6 11.7 - 15.7 g/dL    Hematocrit 42.2 35.0 - 47.0 %    MCV 89 78 - 100 fL    MCH 30.7 26.5 - 33.0 pg    MCHC 34.6 31.5 - 36.5 g/dL    RDW 12.7 10.0 - 15.0 %    Platelet Count 293 150 - 450 10e3/uL     % Neutrophils 73 %    % Lymphocytes 16 %    % Monocytes 8 %    % Eosinophils 2 %    % Basophils 1 %    % Immature Granulocytes 0 %    NRBCs per 100 WBC 0 <1 /100    Absolute Neutrophils 6.6 1.6 - 8.3 10e3/uL    Absolute Lymphocytes 1.5 0.8 - 5.3 10e3/uL    Absolute Monocytes 0.8 0.0 - 1.3 10e3/uL    Absolute Eosinophils 0.1 0.0 - 0.7 10e3/uL    Absolute Basophils 0.1 0.0 - 0.2 10e3/uL    Absolute Immature Granulocytes 0.0 <=0.4 10e3/uL    Absolute NRBCs 0.0 10e3/uL       MEDICATIONS GIVEN IN THE EMERGENCY DEPARTMENT:  Medications - No data to display        Independent Interpretation (X-rays, CTs, rhythm strip):  None    Consultations/Discussion of Management or Tests:  None       Social Determinants of Health affecting care:         Assessments & Plan (with Medical Decision Making)  79 year old female who presents to the Emergency Department for evaluation of concerns regarding knifelike appearance of the left eye, in addition to flashing light sensation of the left eye.  This was transient, only lasting a matter of minutes, and disappeared on its own.  There was no severe headache, or other aura type symptoms.  However, patient has had similar types of symptoms previously when she had migraine headaches in her earlier years.  Patient denies any current headache.  No recent trauma, fall, or other injury.  There are no focal neurologic deficits on exam.  Cranial nerves normal.  Normal upper and lower extremity strength and sensation.  Finger-to-nose normal.  No other symptoms currently.  There was no blurred vision.  Tonometry shows eye pressure of 17.  Patient has had prior cataract surgery.  No recent surgeries.  Given the transient nature of the flashing light sensation, I do feel that this is likely peripheral in etiology, and I do not feel that this represents stroke.  There are no current symptoms.  At minimum, this could be potential for TIA, however I feel that is much less likely as well.  Patient will  be discharged home after laboratory workup including CBC, basic metabolic panel were normal.  Reassurance is given.  Encouraged to follow-up with clinic providers, and eye clinic, and return if new or worsening symptoms develop.       I have reviewed the nursing notes.    I have reviewed the findings, diagnosis, plan and need for follow up with the patient.       Critical Care time:  none      NEW PRESCRIPTIONS STARTED AT TODAY'S ER VISIT  New Prescriptions    No medications on file       Final diagnoses:   Vision changes       7/30/2024   [facility] EMERGENCY DEPT       Pardeep Gorman MD  07/30/24 [id number]

## 2024-07-30 NOTE — TELEPHONE ENCOUNTER
Health Call Center    Phone Message    May a detailed message be left on voicemail: yes     Reason for Call: Appointment Intake    Referring Provider Name: Pardeep Gorman MD  Diagnosis and/or Symptoms: Vision changes    Pt was seen in the ED today and is calling in to schedule follow up for that. Please call pt to schedule. Thank you.      Action Taken: Message routed to:  [facility] EYE    Travel Screening: Not Applicable     Date of Service:

## 2024-07-31 ENCOUNTER — PATIENT OUTREACH (OUTPATIENT)
Dept: FAMILY MEDICINE | Facility: CLINIC | Age: 79
End: 2024-07-31
Payer: COMMERCIAL

## 2024-07-31 NOTE — TELEPHONE ENCOUNTER
Called and spoke to Sofia     Sofia does not drive - relays on her sister for transportation     Requesting to be seen closer to home - Sofia has seen Dr. Simon and will dawna his office to schedule an appointment     Albertina Klein Communication Facilitator on 7/31/2024 at 9:38 AM

## 2024-07-31 NOTE — TELEPHONE ENCOUNTER
Transitions of Care Outreach  Chief Complaint   Patient presents with    Hospital F/U     ED 7/30/24       Most Recent Admission Date: 7/30/2024   Most Recent Admission Diagnosis:      Most Recent Discharge Date: 7/30/2024   Most Recent Discharge Diagnosis: Vision changes - H53.9       Transitions of Care Assessment    Discharge Assessment  How are you doing now that you are home?: I am just fine the flashing sensation has resolved.  How are your symptoms? (Red Flag symptoms escalate to triage hotline per guidelines): Improved  Do you know how to contact your clinic care team if you have future questions or changes to your health status? : Yes  Does the patient have their discharge instructions? : Yes  Does the patient have questions regarding their discharge instructions? : No  Were you started on any new medications or were there changes to any of your previous medications? : No  Does the patient have all of their medications?: Yes  Do you have questions regarding any of your medications? : No  Do you have all of your needed medical supplies or equipment (DME)?  (i.e. oxygen tank, CPAP, cane, etc.): Yes    Follow up Plan     Discharge Follow-Up  Discharge follow up appointment scheduled in alignment with recommended follow up timeframe or Transitions of Risk Category? (Low = within 30 days; Moderate= within 14 days; High= within 7 days): Yes  Discharge Follow Up Appointment Date: 08/01/24  Discharge Follow Up Appointment Scheduled with?: Specialty Care Provider    No future appointments.    Outpatient Plan as outlined on AVS reviewed with patient.    For any urgent concerns, please contact our 24 hour nurse triage line: [phone] ([phone])       Julie Behrendt RN

## 2024-10-15 ENCOUNTER — IMMUNIZATION (OUTPATIENT)
Dept: FAMILY MEDICINE | Facility: CLINIC | Age: 79
End: 2024-10-15
Payer: COMMERCIAL

## 2024-10-15 PROCEDURE — 91320 SARSCV2 VAC 30MCG TRS-SUC IM: CPT

## 2024-10-15 PROCEDURE — 90480 ADMN SARSCOV2 VAC 1/ONLY CMP: CPT

## 2024-10-15 PROCEDURE — G0008 ADMIN INFLUENZA VIRUS VAC: HCPCS

## 2024-10-15 PROCEDURE — 90662 IIV NO PRSV INCREASED AG IM: CPT

## 2024-12-16 NOTE — ED TRIAGE NOTES
0830 blurry vision and floaters in L eye, states she could feel her BP elevate. Since then it has since resolved. Stroke assessment for EMS was negative, A&Ox4. Denies headache. .         
No

## 2025-03-20 ENCOUNTER — E-VISIT (OUTPATIENT)
Dept: URGENT CARE | Facility: CLINIC | Age: 80
End: 2025-03-20
Payer: COMMERCIAL

## 2025-03-20 ENCOUNTER — LAB (OUTPATIENT)
Dept: LAB | Facility: CLINIC | Age: 80
End: 2025-03-20
Payer: COMMERCIAL

## 2025-03-20 ENCOUNTER — TELEPHONE (OUTPATIENT)
Dept: FAMILY MEDICINE | Facility: CLINIC | Age: 80
End: 2025-03-20

## 2025-03-20 DIAGNOSIS — R30.0 DYSURIA: ICD-10-CM

## 2025-03-20 DIAGNOSIS — R30.0 DYSURIA: Primary | ICD-10-CM

## 2025-03-20 LAB
ALBUMIN UR-MCNC: NEGATIVE MG/DL
APPEARANCE UR: CLEAR
BILIRUB UR QL STRIP: NEGATIVE
COLOR UR AUTO: YELLOW
GLUCOSE UR STRIP-MCNC: NEGATIVE MG/DL
HGB UR QL STRIP: NEGATIVE
KETONES UR STRIP-MCNC: ABNORMAL MG/DL
LEUKOCYTE ESTERASE UR QL STRIP: NEGATIVE
NITRATE UR QL: NEGATIVE
PH UR STRIP: 7 [PH] (ref 5–7)
SP GR UR STRIP: 1.01 (ref 1–1.03)
UROBILINOGEN UR STRIP-ACNC: 0.2 E.U./DL

## 2025-03-20 NOTE — PATIENT INSTRUCTIONS
Dear Sofia Freeman,     After reviewing your responses, I would like you to come in for a urine test to make sure we treat you correctly. This urine test is to evaluate you for a possible urinary tract infection, and should be scheduled for today or tomorrow. Schedule a Lab Only appointment here.     Lab appointments are not available at most locations on the weekends. If no Lab Only appointment is available, you should be seen in any of our convenient Walk-in or Urgent Care Centers, which can be found on our website here.     You will receive instructions with your results in [employer] once they are available.     If your symptoms worsen, you develop pain in your back or stomach, develop fevers, or are not improving in 5 days, please contact your primary care provider for an appointment or visit a Walk-in or Urgent Care Center to be seen.     Thanks again for choosing us as your health care partner,     Oliver Mota MD

## 2025-03-20 NOTE — TELEPHONE ENCOUNTER
Patient calling regarding lab result  Informed urine test negative for infection  Advised to call back if worsening signs or symptoms    Linda ORIANA Motta RN on 3/20/2025 at 2:40 PM

## 2025-04-11 ENCOUNTER — HOSPITAL ENCOUNTER (EMERGENCY)
Facility: CLINIC | Age: 80
Discharge: HOME | End: 2025-04-11
Attending: FAMILY MEDICINE | Admitting: FAMILY MEDICINE
Payer: COMMERCIAL

## 2025-04-11 VITALS
BODY MASS INDEX: 25.11 KG/M2 | SYSTOLIC BLOOD PRESSURE: 144 MMHG | OXYGEN SATURATION: 93 % | RESPIRATION RATE: 18 BRPM | HEART RATE: 87 BPM | WEIGHT: 160 LBS | HEIGHT: 67 IN | DIASTOLIC BLOOD PRESSURE: 88 MMHG | TEMPERATURE: 98.4 F

## 2025-04-11 DIAGNOSIS — R19.7 DIARRHEA OF PRESUMED INFECTIOUS ORIGIN: ICD-10-CM

## 2025-04-11 LAB
ALBUMIN SERPL BCG-MCNC: 4.6 G/DL (ref 3.5–5.2)
ALP SERPL-CCNC: 82 U/L (ref 40–150)
ALT SERPL W P-5'-P-CCNC: 19 U/L (ref 0–50)
ANION GAP SERPL CALCULATED.3IONS-SCNC: 14 MMOL/L (ref 7–15)
AST SERPL W P-5'-P-CCNC: 25 U/L (ref 0–45)
BASOPHILS # BLD AUTO: 0.1 10E3/UL (ref 0–0.2)
BASOPHILS NFR BLD AUTO: 1 %
BILIRUB SERPL-MCNC: 2.1 MG/DL
BUN SERPL-MCNC: 14.8 MG/DL (ref 8–23)
CALCIUM SERPL-MCNC: 10.1 MG/DL (ref 8.8–10.4)
CHLORIDE SERPL-SCNC: 91 MMOL/L (ref 98–107)
CREAT SERPL-MCNC: 0.73 MG/DL (ref 0.51–0.95)
EGFRCR SERPLBLD CKD-EPI 2021: 82 ML/MIN/1.73M2
EOSINOPHIL # BLD AUTO: 0.2 10E3/UL (ref 0–0.7)
EOSINOPHIL NFR BLD AUTO: 1 %
ERYTHROCYTE [DISTWIDTH] IN BLOOD BY AUTOMATED COUNT: 13.1 % (ref 10–15)
GLUCOSE SERPL-MCNC: 101 MG/DL (ref 70–99)
HCO3 SERPL-SCNC: 27 MMOL/L (ref 22–29)
HCT VFR BLD AUTO: 44.3 % (ref 35–47)
HGB BLD-MCNC: 14.9 G/DL (ref 11.7–15.7)
HOLD SPECIMEN: NORMAL
IMM GRANULOCYTES # BLD: 0.1 10E3/UL
IMM GRANULOCYTES NFR BLD: 0 %
LYMPHOCYTES # BLD AUTO: 2.5 10E3/UL (ref 0.8–5.3)
LYMPHOCYTES NFR BLD AUTO: 15 %
MAGNESIUM SERPL-MCNC: 2.1 MG/DL (ref 1.7–2.3)
MCH RBC QN AUTO: 30 PG (ref 26.5–33)
MCHC RBC AUTO-ENTMCNC: 33.6 G/DL (ref 31.5–36.5)
MCV RBC AUTO: 89 FL (ref 78–100)
MONOCYTES # BLD AUTO: 1.6 10E3/UL (ref 0–1.3)
MONOCYTES NFR BLD AUTO: 9 %
NEUTROPHILS # BLD AUTO: 12.6 10E3/UL (ref 1.6–8.3)
NEUTROPHILS NFR BLD AUTO: 74 %
NRBC # BLD AUTO: 0 10E3/UL
NRBC BLD AUTO-RTO: 0 /100
PLAT MORPH BLD: ABNORMAL
PLATELET # BLD AUTO: 329 10E3/UL (ref 150–450)
POTASSIUM SERPL-SCNC: 3.2 MMOL/L (ref 3.4–5.3)
PROT SERPL-MCNC: 8.1 G/DL (ref 6.4–8.3)
RBC # BLD AUTO: 4.96 10E6/UL (ref 3.8–5.2)
RBC MORPH BLD: ABNORMAL
SODIUM SERPL-SCNC: 132 MMOL/L (ref 135–145)
VARIANT LYMPHS BLD QL SMEAR: PRESENT
WBC # BLD AUTO: 17 10E3/UL (ref 4–11)

## 2025-04-11 PROCEDURE — 96365 THER/PROPH/DIAG IV INF INIT: CPT | Performed by: FAMILY MEDICINE

## 2025-04-11 PROCEDURE — 85025 COMPLETE CBC W/AUTO DIFF WBC: CPT | Performed by: FAMILY MEDICINE

## 2025-04-11 PROCEDURE — 258N000003 HC RX IP 258 OP 636: Performed by: FAMILY MEDICINE

## 2025-04-11 PROCEDURE — 36415 COLL VENOUS BLD VENIPUNCTURE: CPT | Performed by: FAMILY MEDICINE

## 2025-04-11 PROCEDURE — 99284 EMERGENCY DEPT VISIT MOD MDM: CPT | Mod: 25 | Performed by: FAMILY MEDICINE

## 2025-04-11 PROCEDURE — 250N000013 HC RX MED GY IP 250 OP 250 PS 637: Performed by: FAMILY MEDICINE

## 2025-04-11 PROCEDURE — 87507 IADNA-DNA/RNA PROBE TQ 12-25: CPT | Performed by: FAMILY MEDICINE

## 2025-04-11 PROCEDURE — 84155 ASSAY OF PROTEIN SERUM: CPT | Performed by: FAMILY MEDICINE

## 2025-04-11 PROCEDURE — 99284 EMERGENCY DEPT VISIT MOD MDM: CPT | Performed by: FAMILY MEDICINE

## 2025-04-11 PROCEDURE — 83735 ASSAY OF MAGNESIUM: CPT | Performed by: FAMILY MEDICINE

## 2025-04-11 PROCEDURE — 250N000011 HC RX IP 250 OP 636: Performed by: FAMILY MEDICINE

## 2025-04-11 RX ORDER — CIPROFLOXACIN 500 MG/1
500 TABLET, FILM COATED ORAL ONCE
Status: COMPLETED | OUTPATIENT
Start: 2025-04-11 | End: 2025-04-11

## 2025-04-11 RX ORDER — DEXTROSE MONOHYDRATE AND SODIUM CHLORIDE 5; .9 G/100ML; G/100ML
INJECTION, SOLUTION INTRAVENOUS ONCE
Status: COMPLETED | OUTPATIENT
Start: 2025-04-11 | End: 2025-04-11

## 2025-04-11 RX ORDER — CIPROFLOXACIN 500 MG/1
500 TABLET, FILM COATED ORAL 2 TIMES DAILY
Qty: 10 TABLET | Refills: 0 | Status: SHIPPED | OUTPATIENT
Start: 2025-04-11 | End: 2025-04-12

## 2025-04-11 RX ORDER — POTASSIUM CHLORIDE 7.45 MG/ML
10 INJECTION INTRAVENOUS ONCE
Status: COMPLETED | OUTPATIENT
Start: 2025-04-11 | End: 2025-04-11

## 2025-04-11 RX ADMIN — SODIUM CHLORIDE 1000 ML: 0.9 INJECTION, SOLUTION INTRAVENOUS at 20:18

## 2025-04-11 RX ADMIN — POTASSIUM CHLORIDE 10 MEQ: 7.46 INJECTION, SOLUTION INTRAVENOUS at 20:43

## 2025-04-11 RX ADMIN — DEXTROSE AND SODIUM CHLORIDE: 5; .9 INJECTION, SOLUTION INTRAVENOUS at 20:18

## 2025-04-11 RX ADMIN — CIPROFLOXACIN HYDROCHLORIDE 500 MG: 500 TABLET, FILM COATED ORAL at 23:06

## 2025-04-11 ASSESSMENT — ACTIVITIES OF DAILY LIVING (ADL)
ADLS_ACUITY_SCORE: 45

## 2025-04-11 ASSESSMENT — COLUMBIA-SUICIDE SEVERITY RATING SCALE - C-SSRS
2. HAVE YOU ACTUALLY HAD ANY THOUGHTS OF KILLING YOURSELF IN THE PAST MONTH?: NO
1. IN THE PAST MONTH, HAVE YOU WISHED YOU WERE DEAD OR WISHED YOU COULD GO TO SLEEP AND NOT WAKE UP?: NO
6. HAVE YOU EVER DONE ANYTHING, STARTED TO DO ANYTHING, OR PREPARED TO DO ANYTHING TO END YOUR LIFE?: NO

## 2025-04-12 ENCOUNTER — TELEPHONE (OUTPATIENT)
Dept: NURSING | Facility: CLINIC | Age: 80
End: 2025-04-12
Payer: COMMERCIAL

## 2025-04-12 DIAGNOSIS — A09 DIARRHEA OF INFECTIOUS ORIGIN: Primary | ICD-10-CM

## 2025-04-12 LAB
ADV 40+41 DNA STL QL NAA+NON-PROBE: NEGATIVE
ASTRO TYP 1-8 RNA STL QL NAA+NON-PROBE: NEGATIVE
C CAYETANENSIS DNA STL QL NAA+NON-PROBE: NEGATIVE
CAMPYLOBACTER DNA SPEC NAA+PROBE: NEGATIVE
CRYPTOSP DNA STL QL NAA+NON-PROBE: NEGATIVE
E COLI O157 DNA STL QL NAA+NON-PROBE: NORMAL
E HISTOLYT DNA STL QL NAA+NON-PROBE: NEGATIVE
EAEC ASTA GENE ISLT QL NAA+PROBE: NEGATIVE
EC STX1+STX2 GENES STL QL NAA+NON-PROBE: NEGATIVE
EPEC EAE GENE STL QL NAA+NON-PROBE: NEGATIVE
ETEC LTA+ST1A+ST1B TOX ST NAA+NON-PROBE: NEGATIVE
G LAMBLIA DNA STL QL NAA+NON-PROBE: NEGATIVE
NOROVIRUS GI+II RNA STL QL NAA+NON-PROBE: NEGATIVE
P SHIGELLOIDES DNA STL QL NAA+NON-PROBE: NEGATIVE
RVA RNA STL QL NAA+NON-PROBE: NEGATIVE
SALMONELLA SP RPOD STL QL NAA+PROBE: NEGATIVE
SAPO I+II+IV+V RNA STL QL NAA+NON-PROBE: NEGATIVE
SHIGELLA SP+EIEC IPAH ST NAA+NON-PROBE: NEGATIVE
V CHOLERAE DNA SPEC QL NAA+PROBE: NEGATIVE
VIBRIO DNA SPEC NAA+PROBE: NEGATIVE
Y ENTEROCOL DNA STL QL NAA+PROBE: NEGATIVE

## 2025-04-12 RX ORDER — CIPROFLOXACIN 500 MG/1
500 TABLET, FILM COATED ORAL 2 TIMES DAILY
Qty: 10 TABLET | Refills: 0 | Status: SHIPPED | OUTPATIENT
Start: 2025-04-12

## 2025-04-12 RX ORDER — CIPROFLOXACIN 500 MG/1
500 TABLET, FILM COATED ORAL 2 TIMES DAILY
Qty: 10 TABLET | Refills: 0 | Status: SHIPPED | OUTPATIENT
Start: 2025-04-12 | End: 2025-04-12

## 2025-04-12 NOTE — ED PROVIDER NOTES
"  HPI   Patient is an 79-year-old female presenting with diarrhea and blood per rectum.  Per my chart review, the patient has had a distant appendectomy, colonoscopy, upper endoscopy.  She denies history of GI bleed.  She does not take medication for anticoagulation.  No antiplatelet medication.  She denies recent travel.  No obvious sick contacts.  No recent antibiotics.  She ate at [employer] yesterday morning for breakfast but denies obviously tainted food.    The patient began with diarrhea starting last evening at about 5:00 PM.  She has gone about 16 times since then.  She has recognized \"tannish colored fluid and some spots of blood.\"  No clots of blood identified.  No melena.  She has cramping abdominal pain, left greater than right.  No focal tenderness.  No bloating.  No nausea or vomiting.  No fever.      Allergies:  Allergies   Allergen Reactions    Nkda [No Known Drug Allergy]      Problem List:    Patient Active Problem List    Diagnosis Date Noted    Gilbert's disease 04/22/2024     Priority: Medium     Ultrasound, smear normal, retic count normal       HYPERLIPIDEMIA LDL GOAL <130 10/31/2010     Priority: Medium    Hypertension goal BP (blood pressure) < 140/90 06/19/2006     Priority: Medium    Genital herpes      Priority: Medium      Past Medical History:    Past Medical History:   Diagnosis Date    Cataract 8/17/2011    MIGRAINE NOS W/O MENTN INTRACTABLE      Past Surgical History:    Past Surgical History:   Procedure Laterality Date    APPENDECTOMY OPEN CHILD  1965    Appendectomy    COLONOSCOPY  1/6/2005    colonoscopy-normal repeat in 10 years    ESOPHAGOSCOPY, GASTROSCOPY, DUODENOSCOPY (EGD), COMBINED  9/21/2012    Procedure: COMBINED ESOPHAGOSCOPY, GASTROSCOPY, DUODENOSCOPY (EGD);  Gastroscopy;  Surgeon: Dorothy Hill MD;  Location: [facility]    PHACOEMULSIFICATION WITH STANDARD INTRAOCULAR LENS IMPLANT  8/18/2011    Procedure:PHACOEMULSIFICATION WITH STANDARD INTRAOCULAR LENS " "IMPLANT; Surgeon:JAY URBINA; Location:[facility]    PHACOEMULSIFICATION WITH STANDARD INTRAOCULAR LENS IMPLANT  2011    Procedure:PHACOEMULSIFICATION WITH STANDARD INTRAOCULAR LENS IMPLANT; Left Phacoemulsification with standard intraocular lens implant; Surgeon:JAY URBINA; Location:[facility] OR     Family History:    Family History   Problem Relation Age of Onset    Hypertension Mother     Arthritis Mother     Eye Disorder Mother         glaucoma    Heart Disease Father         ?    Alzheimer Disease Father          from dementia    Hypertension Father     Cerebrovascular Disease Maternal Grandmother     Alzheimer Disease Maternal Grandmother     Diabetes Maternal Grandmother     Alcohol/Drug Maternal Grandfather     Eye Disorder Sister         glaucoma    Arthritis Sister     Eye Disorder Sister         glaucoma    Hypertension Sister     Cancer Sister         Pancreatic     Breast Cancer No family hx of     Colon Cancer No family hx of      Social History:  Marital Status:   [5]  Social History     Tobacco Use    Smoking status: Never    Smokeless tobacco: Never   Vaping Use    Vaping status: Never Used   Substance Use Topics    Alcohol use: No     Alcohol/week: 0.0 standard drinks of alcohol    Drug use: No      Medications:    ciprofloxacin (CIPRO) 500 MG tablet  amLODIPine (NORVASC) 5 MG tablet  aspirin 81 MG tablet  atorvastatin (LIPITOR) 10 MG tablet  hydroCHLOROthiazide 12.5 MG tablet      Review of Systems   All other systems reviewed and are negative.      PE   BP: (!) 162/98  Pulse: 112  Temp: 98.4  F (36.9  C)  Resp: 18  Height: 170.2 cm (5' 7\")  Weight: 72.6 kg (160 lb)  SpO2: 96 %  Physical Exam  Vitals reviewed.   Constitutional:       Appearance: She is well-developed.      Comments: Tired appearing, rundown.   HENT:      Head: Normocephalic and atraumatic.      Right Ear: External ear normal.      Left Ear: External ear normal.      Nose: Nose normal.      Mouth/Throat:      " Mouth: Mucous membranes are dry.      Pharynx: Oropharynx is clear.   Eyes:      Extraocular Movements: Extraocular movements intact.      Conjunctiva/sclera: Conjunctivae normal.      Pupils: Pupils are equal, round, and reactive to light.   Cardiovascular:      Rate and Rhythm: Normal rate and regular rhythm.   Pulmonary:      Effort: Pulmonary effort is normal.   Abdominal:      Palpations: Abdomen is soft.      Tenderness: There is no abdominal tenderness.   Musculoskeletal:         General: Normal range of motion.      Cervical back: Normal range of motion.   Skin:     General: Skin is warm and dry.   Neurological:      Mental Status: She is alert and oriented to person, place, and time.   Psychiatric:         Behavior: Behavior normal.         ED COURSE and MDM   2014.  Patient with diarrhea and spots of blood, as above.  No focal tenderness.  Cramping pain comes and goes.  Patient appears dehydrated, pulse is 112 in triage.  Fluid bolus is ordered.  Lab values pending.  Stool culture as able.    2301.  Patient with mild lab abnormalities.  She feels much improved.  I suspect her electrolyte deficiencies will improve quickly as her diet returns to normal and she is hydrating by mouth along with decreased diarrhea.  We talked at length about bacterial causes of diarrhea.  She does have abdominal cramping and hematochezia.  Ciprofloxacin will be initiated here, prescription provided.  No emergent need for hospitalization.  The patient wants to be discharged home.  Follow-up discussed.  Return for worsening.    Electronic medical chart reviewed, including medical problems, medications, medical allergies, social history.  Recent hospitalizations and surgical procedures reviewed.  Recent clinic visits and consultations reviewed.  Recent labs and test results reviewed.  Nursing notes reviewed.    The patient, their parent if applicable, and/or their medical decision maker(s) and I have reviewed all of the available  historical information, applicable PMH, physical exam findings, and objective diagnostic data gathered during this ED visit.  We then discussed all work-up options and then together agreed upon the course taken during this visit.  The ultimate disposition and plan was a cooperative decision made between myself and the patient, their parent if applicable, and/or their legal decision maker(s).  The risks and benefits of all decisions made during this visit were discussed to the best of my abilities given the circumstances, and all parties are understanding of the pertinent ramifications of these decisions.      LABS  Labs Ordered and Resulted from Time of ED Arrival to Time of ED Departure   COMPREHENSIVE METABOLIC PANEL - Abnormal       Result Value    Sodium 132 (*)     Potassium 3.2 (*)     Carbon Dioxide (CO2) 27      Anion Gap 14      Urea Nitrogen 14.8      Creatinine 0.73      GFR Estimate 82      Calcium 10.1      Chloride 91 (*)     Glucose 101 (*)     Alkaline Phosphatase 82      AST 25      ALT 19      Protein Total 8.1      Albumin 4.6      Bilirubin Total 2.1 (*)    CBC WITH PLATELETS AND DIFFERENTIAL - Abnormal    WBC Count 17.0 (*)     RBC Count 4.96      Hemoglobin 14.9      Hematocrit 44.3      MCV 89      MCH 30.0      MCHC 33.6      RDW 13.1      Platelet Count 329      % Neutrophils 74      % Lymphocytes 15      % Monocytes 9      % Eosinophils 1      % Basophils 1      % Immature Granulocytes 0      NRBCs per 100 WBC 0      Absolute Neutrophils 12.6 (*)     Absolute Lymphocytes 2.5      Absolute Monocytes 1.6 (*)     Absolute Eosinophils 0.2      Absolute Basophils 0.1      Absolute Immature Granulocytes 0.1      Absolute NRBCs 0.0     RBC AND PLATELET MORPHOLOGY - Abnormal    RBC Morphology Confirmed RBC Indices      Platelet Assessment        Value: Automated Count Confirmed. Platelet morphology is normal.    Reactive Lymphocytes Present (*)    MAGNESIUM - Normal    Magnesium 2.1     ENTERIC  BACTERIA AND VIRUS PANEL BY PCR       IMAGING  Images reviewed by me.  Radiology report also reviewed.  No orders to display       Procedures    Medications   ciprofloxacin (CIPRO) tablet 500 mg (has no administration in time range)   sodium chloride 0.9% BOLUS 1,000 mL (0 mLs Intravenous Stopped 4/11/25 2155)   dextrose 5% and 0.9% NaCl infusion (0 mLs Intravenous Stopped 4/11/25 2154)   potassium chloride 10 mEq in 100 mL sterile water infusion (0 mEq Intravenous Stopped 4/11/25 2155)         IMPRESSION       ICD-10-CM    1. Diarrhea of presumed infectious origin  R19.7                Medication List        Started      ciprofloxacin 500 MG tablet  Commonly known as: CIPRO  500 mg, Oral, 2 TIMES DAILY                                  Hiren Cash MD  04/11/25 2160

## 2025-04-12 NOTE — TELEPHONE ENCOUNTER
Pt needs RX sent to [place]    Rx sent to [facility] in [place].    Svitlana Goode RN, Nurse Advisor 6:43 AM 4/12/2025

## 2025-04-12 NOTE — ED TRIAGE NOTES
Patient reports abdominal cramping and bloody diarrhea since yesterday. Patient reports it is a small amount of blood. Patient reports dizziness prior to getting abdominal cramping and then bowel movement. Patient reports at least 12 bowel movements today.     Triage Assessment (Adult)       Row Name 04/11/25 1936          Triage Assessment    Airway WDL WDL        Respiratory WDL    Respiratory WDL WDL        Skin Circulation/Temperature WDL    Skin Circulation/Temperature WDL WDL        Cardiac WDL    Cardiac WDL WDL        Peripheral/Neurovascular WDL    Peripheral Neurovascular WDL WDL        Cognitive/Neuro/Behavioral WDL    Cognitive/Neuro/Behavioral WDL WDL

## 2025-04-12 NOTE — DISCHARGE INSTRUCTIONS
RETURN TO THE EMERGENCY ROOM FOR THE FOLLOWING:    Severely worsened pain, repeated diarrhea and dehydration, fainting, or at anytime for any concern.    FOLLOW UP:    With your primary clinic only as needed.    TREATMENT RECOMMENDATIONS:    Ciprofloxacin 500 mg twice a day for 5 days total.  First dose given in the ED.  Start the prescription tomorrow morning.    NURSE ADVICE LINE:  [phone] or [phone]

## 2025-04-13 ENCOUNTER — HEALTH MAINTENANCE LETTER (OUTPATIENT)
Age: 80
End: 2025-04-13

## 2025-04-23 ENCOUNTER — OFFICE VISIT (OUTPATIENT)
Dept: FAMILY MEDICINE | Facility: CLINIC | Age: 80
End: 2025-04-23
Payer: COMMERCIAL

## 2025-04-23 VITALS
HEIGHT: 65 IN | TEMPERATURE: 97.9 F | SYSTOLIC BLOOD PRESSURE: 138 MMHG | WEIGHT: 169 LBS | DIASTOLIC BLOOD PRESSURE: 88 MMHG | RESPIRATION RATE: 16 BRPM | OXYGEN SATURATION: 97 % | HEART RATE: 84 BPM | BODY MASS INDEX: 28.16 KG/M2

## 2025-04-23 DIAGNOSIS — I10 ESSENTIAL HYPERTENSION WITH GOAL BLOOD PRESSURE LESS THAN 140/90: ICD-10-CM

## 2025-04-23 DIAGNOSIS — E78.5 HYPERLIPIDEMIA LDL GOAL <130: ICD-10-CM

## 2025-04-23 DIAGNOSIS — Z00.00 MEDICARE ANNUAL WELLNESS VISIT, SUBSEQUENT: Primary | ICD-10-CM

## 2025-04-23 PROCEDURE — G0439 PPPS, SUBSEQ VISIT: HCPCS | Performed by: FAMILY MEDICINE

## 2025-04-23 PROCEDURE — 1126F AMNT PAIN NOTED NONE PRSNT: CPT | Performed by: FAMILY MEDICINE

## 2025-04-23 PROCEDURE — 99214 OFFICE O/P EST MOD 30 MIN: CPT | Mod: 25 | Performed by: FAMILY MEDICINE

## 2025-04-23 PROCEDURE — 3079F DIAST BP 80-89 MM HG: CPT | Performed by: FAMILY MEDICINE

## 2025-04-23 PROCEDURE — 3075F SYST BP GE 130 - 139MM HG: CPT | Performed by: FAMILY MEDICINE

## 2025-04-23 RX ORDER — AMLODIPINE BESYLATE 5 MG/1
5 TABLET ORAL DAILY
Qty: 90 TABLET | Refills: 3 | Status: SHIPPED | OUTPATIENT
Start: 2025-04-23

## 2025-04-23 RX ORDER — ATORVASTATIN CALCIUM 10 MG/1
TABLET, FILM COATED ORAL
Qty: 45 TABLET | Refills: 3 | Status: SHIPPED | OUTPATIENT
Start: 2025-04-23

## 2025-04-23 RX ORDER — HYDROCHLOROTHIAZIDE 12.5 MG/1
12.5 TABLET ORAL DAILY
Qty: 90 TABLET | Refills: 3 | Status: SHIPPED | OUTPATIENT
Start: 2025-04-23

## 2025-04-23 SDOH — HEALTH STABILITY: PHYSICAL HEALTH: ON AVERAGE, HOW MANY DAYS PER WEEK DO YOU ENGAGE IN MODERATE TO STRENUOUS EXERCISE (LIKE A BRISK WALK)?: 3 DAYS

## 2025-04-23 SDOH — HEALTH STABILITY: PHYSICAL HEALTH: ON AVERAGE, HOW MANY MINUTES DO YOU ENGAGE IN EXERCISE AT THIS LEVEL?: 20 MIN

## 2025-04-23 ASSESSMENT — SOCIAL DETERMINANTS OF HEALTH (SDOH): HOW OFTEN DO YOU GET TOGETHER WITH FRIENDS OR RELATIVES?: TWICE A WEEK

## 2025-04-23 ASSESSMENT — PAIN SCALES - GENERAL: PAINLEVEL_OUTOF10: NO PAIN (0)

## 2025-04-23 NOTE — PROGRESS NOTES
"Preventive Care Visit  [facility]  Alpesh Arevalo MD, Family Medicine  Apr 23, 2025      Assessment & Plan   Well exam  Htn, stable  Hyperlipidemia, stable    fills on meds for chronic issues as above in med list and orders.   Reviewed labs  Continue medications for medical issues as above in med list and orders         BMI  Estimated body mass index is 27.82 kg/m  as calculated from the following:    Height as of this encounter: 1.66 m (5' 5.35\").    Weight as of this encounter: 76.7 kg (169 lb).       Counseling  Appropriate preventive services were addressed with this patient via screening, questionnaire, or discussion as appropriate for fall prevention, nutrition, physical activity, Tobacco-use cessation, social engagement, weight loss and cognition.  Checklist reviewing preventive services available has been given to the patient.  Reviewed patient's diet, addressing concerns and/or questions.   She is at risk for lack of exercise and has been provided with information to increase physical activity for the benefit of her well-being.   The patient was instructed to see the dentist every 6 months.   The patient was provided with written information regarding signs of hearing loss.           Sylvia sheth is a 79 year old, presenting for the following:  Wellness Visit, Hypertension, and ER F/U  Htn: stable.  Fills and labs  Hyperlipidemia: stable, fills, tolerating statin          4/23/2025     9:11 AM   Additional Questions   Roomed by jem   Accompanied by self           Advance Care Planning    Discussed advance care planning with patient; however, patient declined at this time.        4/23/2025   General Health   How would you rate your overall physical health? Good   Feel stress (tense, anxious, or unable to sleep) Not at all         4/23/2025   Nutrition   Diet: Low salt    Low fat/cholesterol       Multiple values from one day are sorted in reverse-chronological order "         4/23/2025   Exercise   Days per week of moderate/strenous exercise 3 days   Average minutes spent exercising at this level 20 min         4/23/2025   Social Factors   Frequency of gathering with friends or relatives Twice a week   Worry food won't last until get money to buy more No   Food not last or not have enough money for food? No   Do you have housing? (Housing is defined as stable permanent housing and does not include staying ouside in a car, in a tent, in an abandoned building, in an overnight shelter, or couch-surfing.) Yes   Are you worried about losing your housing? No   Lack of transportation? No   Unable to get utilities (heat,electricity)? No         4/23/2025   Fall Risk   Fallen 2 or more times in the past year? No   Trouble with walking or balance? No          4/23/2025   Activities of Daily Living- Home Safety   Needs help with the following daily activites None of the above   Safety concerns in the home None of the above         4/23/2025   Dental   Dentist two times every year? (!) NO         4/23/2025   Hearing Screening   Hearing concerns? (!) TROUBLE UNDERSTANDING SPEECH ON THE TELEPHONE         4/23/2025   Driving Risk Screening   Patient/family members have concerns about driving No         4/23/2025   General Alertness/Fatigue Screening   Have you been more tired than usual lately? No         4/23/2025   Urinary Incontinence Screening   Bothered by leaking urine in past 6 months No         Today's PHQ-2 Score:       4/23/2025     4:33 AM   PHQ-2 ( 1999 [employer])   Q1: Little interest or pleasure in doing things 0   Q2: Feeling down, depressed or hopeless 0   PHQ-2 Score 0    Q1: Little interest or pleasure in doing things Not at all   Q2: Feeling down, depressed or hopeless Not at all   PHQ-2 Score 0       Patient-reported           4/23/2025   Substance Use   Alcohol more than 3/day or more than 7/wk Not Applicable   Do you have a current opioid prescription? No   How severe/bad is  pain from 1 to 10? 0/10 (No Pain)   Do you use any other substances recreationally? No     Social History     Tobacco Use    Smoking status: Never    Smokeless tobacco: Never   Vaping Use    Vaping status: Never Used   Substance Use Topics    Alcohol use: No     Alcohol/week: 0.0 standard drinks of alcohol    Drug use: No           5/8/2024   LAST FHS-7 RESULTS   1st degree relative breast or ovarian cancer No   Any relative bilateral breast cancer No   Any male have breast cancer No   Any ONE woman have BOTH breast AND ovarian cancer No   Any woman with breast cancer before 50yrs No   2 or more relatives with breast AND/OR ovarian cancer No   2 or more relatives with breast AND/OR bowel cancer No           Reviewed and updated as needed this visit by Provider                      Current providers sharing in care for this patient include:  Patient Care Team:  Alpesh Arevalo MD as PCP - General (Family Medicine)  Alpesh Arevalo MD as Assigned PCP    The following health maintenance items are reviewed in Epic and correct as of today:  Health Maintenance   Topic Date Due    ADVANCE CARE PLANNING  05/17/2018    RSV VACCINE (1 - 1-dose 75+ series) Never done    ANNUAL REVIEW OF HM ORDERS  03/10/2023    COVID-19 Vaccine (6 - 2024-25 season) 04/15/2025    LIPID  04/22/2025    MEDICARE ANNUAL WELLNESS VISIT  04/22/2025    BMP  04/11/2026    FALL RISK ASSESSMENT  04/23/2026    DTAP/TDAP/TD IMMUNIZATION (3 - Td or Tdap) 03/13/2033    DEXA  08/15/2034    MIGRAINE ACTION PLAN  Completed    PHQ-2 (once per calendar year)  Completed    INFLUENZA VACCINE  Completed    Pneumococcal Vaccine: 50+ Years  Completed    ZOSTER IMMUNIZATION  Completed    HPV IMMUNIZATION  Aged Out    MENINGITIS IMMUNIZATION  Aged Out    COLORECTAL CANCER SCREENING  Discontinued         Review of Systems  Constitutional, HEENT, cardiovascular, pulmonary, gi and gu systems are negative, except as otherwise noted.     Objective    Exam  /88   Pulse  "84   Temp 97.9  F (36.6  C) (Tympanic)   Resp 16   Ht 1.66 m (5' 5.35\")   Wt 76.7 kg (169 lb)   SpO2 97%   BMI 27.82 kg/m     Estimated body mass index is 27.82 kg/m  as calculated from the following:    Height as of this encounter: 1.66 m (5' 5.35\").    Weight as of this encounter: 76.7 kg (169 lb).    Physical Exam  Gen: alert and oriented, in no acute distress, affect within normal limits  Neck: supple with no masses or nodes  CV: RRR, no murmur  Lungs: clear bilaterally with good effort  Abd: nontender, no mass  Ext: no edema or lesions   Neuro: moving all extremities, gait normal, no focal deficts noted           4/23/2025   Mini Cog   Clock Draw Score 2 Normal   3 Item Recall 2 objects recalled   Mini Cog Total Score 4              Signed Electronically by: Alpesh Arevalo MD    "

## 2025-06-12 NOTE — TELEPHONE ENCOUNTER
First balloon inflation max pressure = 20 tabitha. First balloon inflation duration = 10 seconds. Second inflation of balloon - Max pressure = 20 tabitha. 2nd Inflation of balloon - Duration = 10 seconds. 2nd inflation was done at 10:45 EDT. Third inflation of balloon - Max pressure = 20 tabitha. 3rd Inflation of balloon - Duration = 10 seconds. 3rd inflation was done at 10:45 EDT. Fourth inflation of balloon - Max pressure = 20 tabitha. 4th Inflation  of balloon - Duration = 10 seconds. 4th inflation was done at 10:45 EDT. Patient calling to inquire about today's lab appt. She was intitially told she did not have to be fasting and now got a message in her My Chart stating she needs to be fasting. I confirmed with lab she does not need to be fasting for her blood morphology labs today.   She is aware she needs to fast before her upcoming abdominal ultrasound.   Linda RIGGS RN